# Patient Record
Sex: FEMALE | Race: BLACK OR AFRICAN AMERICAN
[De-identification: names, ages, dates, MRNs, and addresses within clinical notes are randomized per-mention and may not be internally consistent; named-entity substitution may affect disease eponyms.]

---

## 2017-01-23 ENCOUNTER — APPOINTMENT (OUTPATIENT)
Dept: CHRONIC DISEASE MANAGEMENT | Facility: CLINIC | Age: 37
End: 2017-01-23

## 2017-01-23 VITALS — HEIGHT: 65 IN | BODY MASS INDEX: 45.82 KG/M2 | WEIGHT: 275 LBS

## 2017-04-28 ENCOUNTER — APPOINTMENT (OUTPATIENT)
Dept: DERMATOLOGY | Facility: CLINIC | Age: 37
End: 2017-04-28

## 2017-07-14 ENCOUNTER — APPOINTMENT (OUTPATIENT)
Dept: DERMATOLOGY | Facility: CLINIC | Age: 37
End: 2017-07-14

## 2017-10-04 ENCOUNTER — APPOINTMENT (OUTPATIENT)
Dept: FAMILY MEDICINE | Facility: CLINIC | Age: 37
End: 2017-10-04
Payer: MEDICAID

## 2017-10-04 ENCOUNTER — LABORATORY RESULT (OUTPATIENT)
Age: 37
End: 2017-10-04

## 2017-10-04 ENCOUNTER — NON-APPOINTMENT (OUTPATIENT)
Age: 37
End: 2017-10-04

## 2017-10-04 VITALS
BODY MASS INDEX: 45.32 KG/M2 | DIASTOLIC BLOOD PRESSURE: 90 MMHG | HEART RATE: 84 BPM | HEIGHT: 65 IN | WEIGHT: 272 LBS | SYSTOLIC BLOOD PRESSURE: 130 MMHG

## 2017-10-04 DIAGNOSIS — Z82.3 FAMILY HISTORY OF STROKE: ICD-10-CM

## 2017-10-04 DIAGNOSIS — F15.90 OTHER STIMULANT USE, UNSPECIFIED, UNCOMPLICATED: ICD-10-CM

## 2017-10-04 DIAGNOSIS — Z83.3 FAMILY HISTORY OF DIABETES MELLITUS: ICD-10-CM

## 2017-10-04 DIAGNOSIS — Z82.49 FAMILY HISTORY OF ISCHEMIC HEART DISEASE AND OTHER DISEASES OF THE CIRCULATORY SYSTEM: ICD-10-CM

## 2017-10-04 PROCEDURE — 99204 OFFICE O/P NEW MOD 45 MIN: CPT | Mod: 25

## 2017-10-04 PROCEDURE — 93000 ELECTROCARDIOGRAM COMPLETE: CPT

## 2017-10-04 PROCEDURE — 36415 COLL VENOUS BLD VENIPUNCTURE: CPT

## 2017-10-04 RX ORDER — FLUTICASONE PROPIONATE 50 UG/1
50 SPRAY, METERED NASAL DAILY
Qty: 1 | Refills: 1 | Status: ACTIVE | COMMUNITY
Start: 2017-10-04 | End: 1900-01-01

## 2017-10-04 RX ORDER — MECLIZINE HCL 50 MG/1
TABLET ORAL
Refills: 0 | Status: ACTIVE | COMMUNITY

## 2017-10-09 LAB
25(OH)D3 SERPL-MCNC: 23.5 NG/ML
ALBUMIN SERPL ELPH-MCNC: 4.2 G/DL
ALP BLD-CCNC: 56 U/L
ALT SERPL-CCNC: 12 U/L
ANION GAP SERPL CALC-SCNC: 16 MMOL/L
APPEARANCE: CLEAR
AST SERPL-CCNC: 16 U/L
BASOPHILS # BLD AUTO: 0.06 K/UL
BASOPHILS NFR BLD AUTO: 1 %
BILIRUB SERPL-MCNC: 0.3 MG/DL
BILIRUBIN URINE: NEGATIVE
BLOOD URINE: NEGATIVE
BUN SERPL-MCNC: 8 MG/DL
CALCIUM SERPL-MCNC: 9.5 MG/DL
CHLORIDE SERPL-SCNC: 102 MMOL/L
CHOLEST SERPL-MCNC: 193 MG/DL
CHOLEST/HDLC SERPL: 4.8 RATIO
CO2 SERPL-SCNC: 23 MMOL/L
COLOR: ABNORMAL
CREAT SERPL-MCNC: 0.82 MG/DL
EOSINOPHIL # BLD AUTO: 0.22 K/UL
EOSINOPHIL NFR BLD AUTO: 3.7 %
FERRITIN SERPL-MCNC: 14 NG/ML
FOLATE SERPL-MCNC: 19.9 NG/ML
GLUCOSE QUALITATIVE U: NEGATIVE MG/DL
GLUCOSE SERPL-MCNC: 107 MG/DL
HBA1C MFR BLD HPLC: 6.2 %
HCT VFR BLD CALC: 41.2 %
HDLC SERPL-MCNC: 40 MG/DL
HGB BLD-MCNC: 12.4 G/DL
IMM GRANULOCYTES NFR BLD AUTO: 0 %
IRON SATN MFR SERPL: 10 %
IRON SERPL-MCNC: 35 UG/DL
KETONES URINE: NEGATIVE
LDLC SERPL CALC-MCNC: 113 MG/DL
LEUKOCYTE ESTERASE URINE: NEGATIVE
LYMPHOCYTES # BLD AUTO: 2.22 K/UL
LYMPHOCYTES NFR BLD AUTO: 37.4 %
MAN DIFF?: NORMAL
MCHC RBC-ENTMCNC: 23.6 PG
MCHC RBC-ENTMCNC: 30.1 GM/DL
MCV RBC AUTO: 78.5 FL
MONOCYTES # BLD AUTO: 0.36 K/UL
MONOCYTES NFR BLD AUTO: 6.1 %
NEUTROPHILS # BLD AUTO: 3.07 K/UL
NEUTROPHILS NFR BLD AUTO: 51.8 %
NITRITE URINE: NEGATIVE
PH URINE: 5
PLATELET # BLD AUTO: 450 K/UL
POTASSIUM SERPL-SCNC: 4 MMOL/L
PROT SERPL-MCNC: 7.7 G/DL
PROTEIN URINE: NEGATIVE MG/DL
RBC # BLD: 5.25 M/UL
RBC # FLD: 16.6 %
SODIUM SERPL-SCNC: 141 MMOL/L
SPECIFIC GRAVITY URINE: 1.02
T4 FREE SERPL-MCNC: 1.5 NG/DL
TIBC SERPL-MCNC: 366 UG/DL
TRIGL SERPL-MCNC: 201 MG/DL
TSH SERPL-ACNC: 1.08 UIU/ML
UIBC SERPL-MCNC: 331 UG/DL
UROBILINOGEN URINE: NEGATIVE MG/DL
VIT B12 SERPL-MCNC: 887 PG/ML
WBC # FLD AUTO: 5.93 K/UL

## 2017-10-17 ENCOUNTER — APPOINTMENT (OUTPATIENT)
Dept: FAMILY MEDICINE | Facility: CLINIC | Age: 37
End: 2017-10-17
Payer: MEDICAID

## 2017-10-17 VITALS
HEIGHT: 65 IN | BODY MASS INDEX: 45.82 KG/M2 | WEIGHT: 275 LBS | DIASTOLIC BLOOD PRESSURE: 82 MMHG | HEART RATE: 80 BPM | SYSTOLIC BLOOD PRESSURE: 122 MMHG

## 2017-10-17 DIAGNOSIS — R42 DIZZINESS AND GIDDINESS: ICD-10-CM

## 2017-10-17 PROCEDURE — 99213 OFFICE O/P EST LOW 20 MIN: CPT | Mod: 25

## 2017-10-17 PROCEDURE — 99395 PREV VISIT EST AGE 18-39: CPT | Mod: 25

## 2017-11-14 ENCOUNTER — APPOINTMENT (OUTPATIENT)
Dept: FAMILY MEDICINE | Facility: CLINIC | Age: 37
End: 2017-11-14
Payer: MEDICAID

## 2017-11-14 VITALS
HEART RATE: 77 BPM | BODY MASS INDEX: 46.98 KG/M2 | TEMPERATURE: 98.5 F | OXYGEN SATURATION: 98 % | DIASTOLIC BLOOD PRESSURE: 72 MMHG | HEIGHT: 65 IN | SYSTOLIC BLOOD PRESSURE: 112 MMHG | WEIGHT: 282 LBS

## 2017-11-14 DIAGNOSIS — K21.9 GASTRO-ESOPHAGEAL REFLUX DISEASE W/OUT ESOPHAGITIS: ICD-10-CM

## 2017-11-14 PROCEDURE — 99214 OFFICE O/P EST MOD 30 MIN: CPT

## 2017-11-14 RX ORDER — MULTIVITAMIN WITH FOLIC ACID 400 MCG
TABLET ORAL
Qty: 30 | Refills: 0 | Status: ACTIVE | COMMUNITY
Start: 2016-09-29

## 2017-11-14 RX ORDER — KETOCONAZOLE 20 MG/G
2 CREAM TOPICAL
Qty: 60 | Refills: 0 | Status: ACTIVE | COMMUNITY
Start: 2017-07-14

## 2017-11-14 RX ORDER — FAMOTIDINE 40 MG/1
40 TABLET, FILM COATED ORAL DAILY
Qty: 30 | Refills: 2 | Status: ACTIVE | COMMUNITY
Start: 2017-11-14 | End: 1900-01-01

## 2018-01-16 ENCOUNTER — APPOINTMENT (OUTPATIENT)
Dept: FAMILY MEDICINE | Facility: CLINIC | Age: 38
End: 2018-01-16

## 2018-11-30 ENCOUNTER — APPOINTMENT (OUTPATIENT)
Dept: FAMILY MEDICINE | Facility: CLINIC | Age: 38
End: 2018-11-30
Payer: MEDICAID

## 2018-11-30 VITALS
BODY MASS INDEX: 44.82 KG/M2 | HEIGHT: 65 IN | HEART RATE: 80 BPM | SYSTOLIC BLOOD PRESSURE: 118 MMHG | OXYGEN SATURATION: 98 % | DIASTOLIC BLOOD PRESSURE: 78 MMHG | TEMPERATURE: 99 F | WEIGHT: 269 LBS

## 2018-11-30 PROCEDURE — 99214 OFFICE O/P EST MOD 30 MIN: CPT | Mod: 25

## 2018-11-30 RX ORDER — ALBUTEROL SULFATE 90 UG/1
108 (90 BASE) AEROSOL, METERED RESPIRATORY (INHALATION)
Qty: 1 | Refills: 0 | Status: ACTIVE | COMMUNITY
Start: 2018-11-30 | End: 1900-01-01

## 2018-11-30 RX ORDER — FEXOFENADINE HYDROCHLORIDE 180 MG/1
180 TABLET ORAL DAILY
Qty: 30 | Refills: 0 | Status: ACTIVE | COMMUNITY
Start: 2017-10-04 | End: 1900-01-01

## 2018-11-30 RX ORDER — ALBUTEROL SULFATE 2.5 MG/3ML
(2.5 MG/3ML) SOLUTION RESPIRATORY (INHALATION)
Qty: 0 | Refills: 0 | Status: COMPLETED | OUTPATIENT
Start: 2018-11-30

## 2018-11-30 RX ADMIN — ALBUTEROL SULFATE 1 0.083%: 2.5 SOLUTION RESPIRATORY (INHALATION) at 00:00

## 2018-12-04 NOTE — PHYSICAL EXAM
[No Acute Distress] : no acute distress [Well Nourished] : well nourished [Well Developed] : well developed [Well-Appearing] : well-appearing [Normal Sclera/Conjunctiva] : normal sclera/conjunctiva [PERRL] : pupils equal round and reactive to light [EOMI] : extraocular movements intact [Normal Oropharynx] : the oropharynx was normal [No JVD] : no jugular venous distention [Supple] : supple [No Lymphadenopathy] : no lymphadenopathy [Thyroid Normal, No Nodules] : the thyroid was normal and there were no nodules present [No Respiratory Distress] : no respiratory distress  [No Accessory Muscle Use] : no accessory muscle use [Normal Rate] : normal rate  [Regular Rhythm] : with a regular rhythm [Normal S1, S2] : normal S1 and S2 [No Murmur] : no murmur heard [No Carotid Bruits] : no carotid bruits [No Abdominal Bruit] : a ~M bruit was not heard ~T in the abdomen [No Varicosities] : no varicosities [Pedal Pulses Present] : the pedal pulses are present [No Edema] : there was no peripheral edema [No Extremity Clubbing/Cyanosis] : no extremity clubbing/cyanosis [No Palpable Aorta] : no palpable aorta [Soft] : abdomen soft [Non Tender] : non-tender [Non-distended] : non-distended [No Masses] : no abdominal mass palpated [No HSM] : no HSM [Normal Bowel Sounds] : normal bowel sounds [Normal Posterior Cervical Nodes] : no posterior cervical lymphadenopathy [Normal Anterior Cervical Nodes] : no anterior cervical lymphadenopathy [No CVA Tenderness] : no CVA  tenderness [No Spinal Tenderness] : no spinal tenderness [No Joint Swelling] : no joint swelling [Grossly Normal Strength/Tone] : grossly normal strength/tone [No Rash] : no rash [Normal Gait] : normal gait [Coordination Grossly Intact] : coordination grossly intact [No Focal Deficits] : no focal deficits [Deep Tendon Reflexes (DTR)] : deep tendon reflexes were 2+ and symmetric [Normal Affect] : the affect was normal [Normal Insight/Judgement] : insight and judgment were intact [de-identified] : serous otitis [de-identified] : decrease air entry

## 2018-12-04 NOTE — HISTORY OF PRESENT ILLNESS
[___ Days ago] : [unfilled] days ago [Congestion] : congestion [Cough] : cough [Wheezing] : wheezing [Shortness Of Breath] : shortness of breath [Earache] : earache [Activity] : with activity [At Night] : at night [Worsening] : worsening [Sore Throat] : no sore throat [Chills] : no chills [Anorexia] : no anorexia [Fatigue] : not fatigue [Headache] : no headache [Fever] : no fever

## 2018-12-07 ENCOUNTER — APPOINTMENT (OUTPATIENT)
Dept: FAMILY MEDICINE | Facility: CLINIC | Age: 38
End: 2018-12-07
Payer: MEDICAID

## 2018-12-07 VITALS
SYSTOLIC BLOOD PRESSURE: 120 MMHG | HEART RATE: 78 BPM | DIASTOLIC BLOOD PRESSURE: 78 MMHG | HEIGHT: 65 IN | WEIGHT: 269 LBS | BODY MASS INDEX: 44.82 KG/M2

## 2018-12-07 DIAGNOSIS — R06.2 WHEEZING: ICD-10-CM

## 2018-12-07 PROCEDURE — 99214 OFFICE O/P EST MOD 30 MIN: CPT

## 2018-12-07 RX ORDER — FLUTICASONE FUROATE AND VILANTEROL TRIFENATATE 100; 25 UG/1; UG/1
100-25 POWDER RESPIRATORY (INHALATION)
Qty: 1 | Refills: 2 | Status: ACTIVE | COMMUNITY
Start: 2018-12-07 | End: 1900-01-01

## 2018-12-07 NOTE — HISTORY OF PRESENT ILLNESS
[FreeTextEntry1] : cough follow up [de-identified] : feels better but still has some lower lung tightness, used Ventolin very infrequently [___ Days ago] : [unfilled] days ago [Congestion] : congestion [Cough] : cough [Sore Throat] : no sore throat [Wheezing] : wheezing [Chills] : no chills [Anorexia] : no anorexia [Shortness Of Breath] : shortness of breath [Earache] : earache [Fatigue] : not fatigue [Headache] : no headache [Fever] : no fever [Activity] : with activity [At Night] : at night [Worsening] : worsening

## 2018-12-07 NOTE — PLAN
[FreeTextEntry1] : # add Breo/ gargle mouth\par # continue ventolin as needed\par follow up PRN or 1 month for CPE\par \par \par # zpack\par # allegra\par # follow up 2 week

## 2018-12-07 NOTE — PHYSICAL EXAM
[No Acute Distress] : no acute distress [Well Nourished] : well nourished [Well Developed] : well developed [Well-Appearing] : well-appearing [Normal Sclera/Conjunctiva] : normal sclera/conjunctiva [PERRL] : pupils equal round and reactive to light [EOMI] : extraocular movements intact [Normal Oropharynx] : the oropharynx was normal [No JVD] : no jugular venous distention [Supple] : supple [No Lymphadenopathy] : no lymphadenopathy [Thyroid Normal, No Nodules] : the thyroid was normal and there were no nodules present [No Respiratory Distress] : no respiratory distress  [No Accessory Muscle Use] : no accessory muscle use [Normal Rate] : normal rate  [Regular Rhythm] : with a regular rhythm [Normal S1, S2] : normal S1 and S2 [No Murmur] : no murmur heard [No Carotid Bruits] : no carotid bruits [No Abdominal Bruit] : a ~M bruit was not heard ~T in the abdomen [No Varicosities] : no varicosities [Pedal Pulses Present] : the pedal pulses are present [No Edema] : there was no peripheral edema [No Extremity Clubbing/Cyanosis] : no extremity clubbing/cyanosis [No Palpable Aorta] : no palpable aorta [Soft] : abdomen soft [Non Tender] : non-tender [Non-distended] : non-distended [No Masses] : no abdominal mass palpated [No HSM] : no HSM [Normal Bowel Sounds] : normal bowel sounds [Normal Posterior Cervical Nodes] : no posterior cervical lymphadenopathy [Normal Anterior Cervical Nodes] : no anterior cervical lymphadenopathy [No CVA Tenderness] : no CVA  tenderness [No Spinal Tenderness] : no spinal tenderness [No Joint Swelling] : no joint swelling [Grossly Normal Strength/Tone] : grossly normal strength/tone [No Rash] : no rash [Normal Gait] : normal gait [Coordination Grossly Intact] : coordination grossly intact [No Focal Deficits] : no focal deficits [Deep Tendon Reflexes (DTR)] : deep tendon reflexes were 2+ and symmetric [Normal Affect] : the affect was normal [Normal Insight/Judgement] : insight and judgment were intact [de-identified] : serous otitis [de-identified] : decrease air entry lower lung bases

## 2019-01-02 ENCOUNTER — RX RENEWAL (OUTPATIENT)
Age: 39
End: 2019-01-02

## 2019-01-02 RX ORDER — ALBUTEROL SULFATE 90 UG/1
108 (90 BASE) AEROSOL, METERED RESPIRATORY (INHALATION)
Qty: 1 | Refills: 0 | Status: ACTIVE | COMMUNITY
Start: 2019-01-02 | End: 1900-01-01

## 2019-01-10 ENCOUNTER — LABORATORY RESULT (OUTPATIENT)
Age: 39
End: 2019-01-10

## 2019-01-11 ENCOUNTER — APPOINTMENT (OUTPATIENT)
Dept: FAMILY MEDICINE | Facility: CLINIC | Age: 39
End: 2019-01-11
Payer: MEDICAID

## 2019-01-11 VITALS
HEIGHT: 65 IN | BODY MASS INDEX: 44.98 KG/M2 | HEART RATE: 80 BPM | WEIGHT: 270 LBS | SYSTOLIC BLOOD PRESSURE: 110 MMHG | DIASTOLIC BLOOD PRESSURE: 76 MMHG

## 2019-01-11 DIAGNOSIS — R05 COUGH: ICD-10-CM

## 2019-01-11 PROCEDURE — 36415 COLL VENOUS BLD VENIPUNCTURE: CPT

## 2019-01-11 PROCEDURE — 99395 PREV VISIT EST AGE 18-39: CPT | Mod: 25

## 2019-01-11 PROCEDURE — 99214 OFFICE O/P EST MOD 30 MIN: CPT | Mod: 25

## 2019-01-15 LAB
25(OH)D3 SERPL-MCNC: 23.2 NG/ML
ALBUMIN SERPL ELPH-MCNC: 4 G/DL
ALP BLD-CCNC: 59 U/L
ALT SERPL-CCNC: 7 U/L
ANION GAP SERPL CALC-SCNC: 12 MMOL/L
APPEARANCE: ABNORMAL
AST SERPL-CCNC: 12 U/L
BASOPHILS # BLD AUTO: 0.02 K/UL
BASOPHILS NFR BLD AUTO: 0.3 %
BILIRUB SERPL-MCNC: 0.3 MG/DL
BILIRUBIN URINE: NEGATIVE
BLOOD URINE: NEGATIVE
BUN SERPL-MCNC: 7 MG/DL
C TRACH RRNA SPEC QL NAA+PROBE: NOT DETECTED
CALCIUM SERPL-MCNC: 9.5 MG/DL
CHLORIDE SERPL-SCNC: 107 MMOL/L
CHOLEST SERPL-MCNC: 191 MG/DL
CHOLEST/HDLC SERPL: 4.4 RATIO
CO2 SERPL-SCNC: 24 MMOL/L
COLOR: YELLOW
CREAT SERPL-MCNC: 0.8 MG/DL
EOSINOPHIL # BLD AUTO: 0.19 K/UL
EOSINOPHIL NFR BLD AUTO: 2.7 %
FERRITIN SERPL-MCNC: 11 NG/ML
FOLATE SERPL-MCNC: 6.4 NG/ML
GLUCOSE QUALITATIVE U: NEGATIVE MG/DL
GLUCOSE SERPL-MCNC: 105 MG/DL
HBA1C MFR BLD HPLC: 6.1 %
HBV CORE IGG+IGM SER QL: NONREACTIVE
HBV SURFACE AB SER QL: NONREACTIVE
HBV SURFACE AG SER QL: NONREACTIVE
HCT VFR BLD CALC: 39.7 %
HCV AB SER QL: NONREACTIVE
HCV S/CO RATIO: 0.09 S/CO
HDLC SERPL-MCNC: 43 MG/DL
HGB BLD-MCNC: 12 G/DL
HIV1+2 AB SPEC QL IA.RAPID: NONREACTIVE
HSV 1+2 IGG SER IA-IMP: NEGATIVE
HSV 1+2 IGG SER IA-IMP: POSITIVE
HSV1 IGG SER QL: 46.1 INDEX
HSV2 IGG SER QL: 0.11 INDEX
IMM GRANULOCYTES NFR BLD AUTO: 0.1 %
IRON SATN MFR SERPL: 8 %
IRON SERPL-MCNC: 31 UG/DL
KETONES URINE: NEGATIVE
LDLC SERPL CALC-MCNC: 108 MG/DL
LEUKOCYTE ESTERASE URINE: NEGATIVE
LYMPHOCYTES # BLD AUTO: 2.73 K/UL
LYMPHOCYTES NFR BLD AUTO: 38.7 %
MAN DIFF?: NORMAL
MCHC RBC-ENTMCNC: 23 PG
MCHC RBC-ENTMCNC: 30.2 GM/DL
MCV RBC AUTO: 76.2 FL
MONOCYTES # BLD AUTO: 0.48 K/UL
MONOCYTES NFR BLD AUTO: 6.8 %
N GONORRHOEA RRNA SPEC QL NAA+PROBE: NOT DETECTED
NEUTROPHILS # BLD AUTO: 3.63 K/UL
NEUTROPHILS NFR BLD AUTO: 51.4 %
NITRITE URINE: NEGATIVE
PH URINE: 5
PLATELET # BLD AUTO: 505 K/UL
POTASSIUM SERPL-SCNC: 4 MMOL/L
PROT SERPL-MCNC: 6.9 G/DL
PROTEIN URINE: NEGATIVE MG/DL
RBC # BLD: 5.21 M/UL
RBC # FLD: 15.9 %
RHEUMATOID FACT SER QL: <10 IU/ML
SODIUM SERPL-SCNC: 143 MMOL/L
SOURCE AMPLIFICATION: NORMAL
SPECIFIC GRAVITY URINE: 1.02
T PALLIDUM AB SER QL IA: NEGATIVE
T4 FREE SERPL-MCNC: 1.1 NG/DL
TIBC SERPL-MCNC: 377 UG/DL
TRIGL SERPL-MCNC: 201 MG/DL
TSH SERPL-ACNC: 2.28 UIU/ML
UIBC SERPL-MCNC: 346 UG/DL
URATE SERPL-MCNC: 4.7 MG/DL
UROBILINOGEN URINE: NEGATIVE MG/DL
VIT B12 SERPL-MCNC: 517 PG/ML
WBC # FLD AUTO: 7.06 K/UL

## 2019-01-15 NOTE — PHYSICAL EXAM
[No Acute Distress] : no acute distress [Well Nourished] : well nourished [Well Developed] : well developed [Well-Appearing] : well-appearing [Normal Sclera/Conjunctiva] : normal sclera/conjunctiva [PERRL] : pupils equal round and reactive to light [EOMI] : extraocular movements intact [Normal Oropharynx] : the oropharynx was normal [No JVD] : no jugular venous distention [Supple] : supple [No Lymphadenopathy] : no lymphadenopathy [Thyroid Normal, No Nodules] : the thyroid was normal and there were no nodules present [No Respiratory Distress] : no respiratory distress  [No Accessory Muscle Use] : no accessory muscle use [Normal Rate] : normal rate  [Regular Rhythm] : with a regular rhythm [Normal S1, S2] : normal S1 and S2 [No Murmur] : no murmur heard [No Carotid Bruits] : no carotid bruits [No Abdominal Bruit] : a ~M bruit was not heard ~T in the abdomen [No Varicosities] : no varicosities [Pedal Pulses Present] : the pedal pulses are present [No Edema] : there was no peripheral edema [No Extremity Clubbing/Cyanosis] : no extremity clubbing/cyanosis [No Palpable Aorta] : no palpable aorta [Soft] : abdomen soft [Non Tender] : non-tender [Non-distended] : non-distended [No Masses] : no abdominal mass palpated [No HSM] : no HSM [Normal Bowel Sounds] : normal bowel sounds [Normal Posterior Cervical Nodes] : no posterior cervical lymphadenopathy [Normal Anterior Cervical Nodes] : no anterior cervical lymphadenopathy [No CVA Tenderness] : no CVA  tenderness [No Spinal Tenderness] : no spinal tenderness [No Joint Swelling] : no joint swelling [Grossly Normal Strength/Tone] : grossly normal strength/tone [No Rash] : no rash [Normal Gait] : normal gait [Coordination Grossly Intact] : coordination grossly intact [No Focal Deficits] : no focal deficits [Deep Tendon Reflexes (DTR)] : deep tendon reflexes were 2+ and symmetric [Normal Affect] : the affect was normal [Normal Insight/Judgement] : insight and judgment were intact [de-identified] : serous otitis [de-identified] : decrease air entry lower lung bases

## 2019-01-15 NOTE — PLAN
[FreeTextEntry1] : # lab\par # hand tendinitis- ice/motrina nd hand splints/ if no improvement will send patient to ortho\par # cough improving.\par # add Breo/ gargle mouth\par # continue ventolin as needed\par follow up PRN or after lab to discuss weight management\par \par \par # zpack\par # allegra\par # follow up 2 week

## 2019-01-15 NOTE — HISTORY OF PRESENT ILLNESS
[de-identified] : feels better but still has some lower lung tightness, used Ventolin very infrequently now.\elizabeth has hand pain shameka on the right thum- patient is a  and her hands are in constant motion\elizabeth wants to work on weight [FreeTextEntry1] : cough follow up [___ Days ago] : [unfilled] days ago [Congestion] : congestion [Cough] : cough [Sore Throat] : no sore throat [Wheezing] : wheezing [Chills] : no chills [Anorexia] : no anorexia [Shortness Of Breath] : shortness of breath [Earache] : earache [Fatigue] : not fatigue [Headache] : no headache [Fever] : no fever [Activity] : with activity [At Night] : at night [Worsening] : worsening

## 2019-01-15 NOTE — HEALTH RISK ASSESSMENT
[Good] : ~his/her~  mood as  good [] : No [No falls in past year] : Patient reported no falls in the past year [0] : 2) Feeling down, depressed, or hopeless: Not at all (0) [Change in mental status noted] : No change in mental status noted [Language] : denies difficulty with language [Handling Complex Tasks] : denies difficulty handling complex tasks [None] : None [Employed] : employed [Sexually Active] : sexually active [Feels Safe at Home] : Feels safe at home [Fully functional (bathing, dressing, toileting, transferring, walking, feeding)] : Fully functional (bathing, dressing, toileting, transferring, walking, feeding) [Fully functional (using the telephone, shopping, preparing meals, housekeeping, doing laundry, using] : Fully functional and needs no help or supervision to perform IADLs (using the telephone, shopping, preparing meals, housekeeping, doing laundry, using transportation, managing medications and managing finances) [Discussed at today's visit] : Advance Directives Discussed at today's visit

## 2019-01-18 LAB — ANA SER IF-ACNC: NEGATIVE

## 2019-01-25 ENCOUNTER — APPOINTMENT (OUTPATIENT)
Dept: FAMILY MEDICINE | Facility: CLINIC | Age: 39
End: 2019-01-25
Payer: MEDICAID

## 2019-01-25 VITALS
BODY MASS INDEX: 44.98 KG/M2 | SYSTOLIC BLOOD PRESSURE: 118 MMHG | WEIGHT: 270 LBS | HEART RATE: 78 BPM | HEIGHT: 65 IN | DIASTOLIC BLOOD PRESSURE: 72 MMHG

## 2019-01-25 PROCEDURE — 99214 OFFICE O/P EST MOD 30 MIN: CPT

## 2019-01-27 NOTE — HISTORY OF PRESENT ILLNESS
[___ Days ago] : [unfilled] days ago [Congestion] : congestion [Cough] : cough [Wheezing] : wheezing [Shortness Of Breath] : shortness of breath [Earache] : earache [Activity] : with activity [At Night] : at night [Worsening] : worsening [de-identified] : feels better but still has some lower lung tightness, used Ventolin very infrequently now.\par has hand pain shameka on the right thum- patient is a  and her hands are in constant motion\par wants to work on weight\par 01/2019- feel better with cough but thumb right hand tendinitis not doing so well got wrist splint but thumb is not stablised. \par \par also wants to work on weight loss,trying hard but its not working [FreeTextEntry1] : Physical [Sore Throat] : no sore throat [Chills] : no chills [Anorexia] : no anorexia [Fatigue] : not fatigue [Headache] : no headache [Fever] : no fever

## 2019-01-27 NOTE — HEALTH RISK ASSESSMENT
[No falls in past year] : Patient reported no falls in the past year [0] : 2) Feeling down, depressed, or hopeless: Not at all (0) [Good] : ~his/her~  mood as  good [None] : None [Employed] : employed [Sexually Active] : sexually active [Feels Safe at Home] : Feels safe at home [Fully functional (bathing, dressing, toileting, transferring, walking, feeding)] : Fully functional (bathing, dressing, toileting, transferring, walking, feeding) [Fully functional (using the telephone, shopping, preparing meals, housekeeping, doing laundry, using] : Fully functional and needs no help or supervision to perform IADLs (using the telephone, shopping, preparing meals, housekeeping, doing laundry, using transportation, managing medications and managing finances) [Discussed at today's visit] : Advance Directives Discussed at today's visit [] : No [Change in mental status noted] : No change in mental status noted [Language] : denies difficulty with language [Handling Complex Tasks] : denies difficulty handling complex tasks

## 2019-01-27 NOTE — PHYSICAL EXAM
[No Acute Distress] : no acute distress [Well Nourished] : well nourished [Well Developed] : well developed [Well-Appearing] : well-appearing [Normal Sclera/Conjunctiva] : normal sclera/conjunctiva [PERRL] : pupils equal round and reactive to light [EOMI] : extraocular movements intact [Normal Oropharynx] : the oropharynx was normal [No JVD] : no jugular venous distention [Supple] : supple [No Lymphadenopathy] : no lymphadenopathy [Thyroid Normal, No Nodules] : the thyroid was normal and there were no nodules present [No Respiratory Distress] : no respiratory distress  [No Accessory Muscle Use] : no accessory muscle use [Normal Rate] : normal rate  [Regular Rhythm] : with a regular rhythm [Normal S1, S2] : normal S1 and S2 [No Murmur] : no murmur heard [No Carotid Bruits] : no carotid bruits [No Abdominal Bruit] : a ~M bruit was not heard ~T in the abdomen [No Varicosities] : no varicosities [Pedal Pulses Present] : the pedal pulses are present [No Edema] : there was no peripheral edema [No Extremity Clubbing/Cyanosis] : no extremity clubbing/cyanosis [No Palpable Aorta] : no palpable aorta [Soft] : abdomen soft [Non Tender] : non-tender [Non-distended] : non-distended [No Masses] : no abdominal mass palpated [No HSM] : no HSM [Normal Bowel Sounds] : normal bowel sounds [Normal Posterior Cervical Nodes] : no posterior cervical lymphadenopathy [Normal Anterior Cervical Nodes] : no anterior cervical lymphadenopathy [No CVA Tenderness] : no CVA  tenderness [No Spinal Tenderness] : no spinal tenderness [No Joint Swelling] : no joint swelling [Grossly Normal Strength/Tone] : grossly normal strength/tone [No Rash] : no rash [Normal Gait] : normal gait [Coordination Grossly Intact] : coordination grossly intact [No Focal Deficits] : no focal deficits [Deep Tendon Reflexes (DTR)] : deep tendon reflexes were 2+ and symmetric [Normal Affect] : the affect was normal [Normal Insight/Judgement] : insight and judgment were intact [de-identified] : serous otitis [de-identified] : decrease air entry lower lung bases

## 2019-01-27 NOTE — PLAN
[FreeTextEntry1] : # obsity- start phentermine/topamax for weight loss as directed\par follow a diet and exercise works for her.\par \par Diet and Activity: - Choose lean meats and poultry without skin and prepare them without added saturated and trans \par fat. Eat fish at least,twice a week. Recent research shows that eating oily \par fish containing omega-3,fatty acids (for example, salmon, trout and herring) \par may help lower your risk of death from coronary artery disease. Select \par fat-free, 1 percent fat and low-fat dairy products. Cut back on foods \par containing partially hydrogenated vegetable oils to reduce trans fat in your \par diet. To lower cholesterol, reduce saturated fat to no more than 5 to 6 \par percent of total calories. For someone eating 2,000 calories a day, that’s \par about 13 grams of saturated fat. Cut back on beverages and foods with added \par sugars. Choose and prepare foods with little or no salt. To lower blood \par pressure, aim to eat no more than 2,400 milligrams of sodium per day. \par Reducing daily intake to 1,500 mg is desirable because it can lower blood \par pressure even further. If you drink alcohol, drink in moderation. That means \par one drink per day if youre a woman and two drinks per day if youre a \par man Follow the American Heart Association recommendations when you eat out, \par and keep an eye on your portion sizes.\par \par discussed in detail and patient agrees to try harder with 3x/ week exercise\par \par # lab- normal \par # hand tendinitis- ice/motrina nd hand splints/ if no improvement will send patient to ortho- advise thumb stablization and \par # cough improving.\par # add Breo/ gargle mouth\par # continue ventolin as needed\par follow up PRN or after lab to discuss weight management\par \par \par # zpack\par # allegra\par # follow up 2 week

## 2019-02-26 ENCOUNTER — RX RENEWAL (OUTPATIENT)
Age: 39
End: 2019-02-26

## 2019-03-11 ENCOUNTER — APPOINTMENT (OUTPATIENT)
Dept: FAMILY MEDICINE | Facility: CLINIC | Age: 39
End: 2019-03-11

## 2019-06-07 ENCOUNTER — APPOINTMENT (OUTPATIENT)
Dept: FAMILY MEDICINE | Facility: CLINIC | Age: 39
End: 2019-06-07
Payer: MEDICAID

## 2019-06-07 VITALS
HEART RATE: 80 BPM | BODY MASS INDEX: 42.49 KG/M2 | SYSTOLIC BLOOD PRESSURE: 120 MMHG | HEIGHT: 65 IN | DIASTOLIC BLOOD PRESSURE: 78 MMHG | WEIGHT: 255 LBS

## 2019-06-07 DIAGNOSIS — M25.519 CERVICALGIA: ICD-10-CM

## 2019-06-07 DIAGNOSIS — M54.2 CERVICALGIA: ICD-10-CM

## 2019-06-07 PROCEDURE — 99214 OFFICE O/P EST MOD 30 MIN: CPT | Mod: 25

## 2019-06-07 PROCEDURE — 96372 THER/PROPH/DIAG INJ SC/IM: CPT

## 2019-06-07 RX ORDER — CYANOCOBALAMIN 1000 UG/ML
1000 INJECTION INTRAMUSCULAR; SUBCUTANEOUS
Qty: 0 | Refills: 0 | Status: COMPLETED | OUTPATIENT
Start: 2019-06-07

## 2019-06-07 RX ADMIN — CYANOCOBALAMIN 0 MCG/ML: 1000 INJECTION, SOLUTION INTRAMUSCULAR; SUBCUTANEOUS at 00:00

## 2019-06-07 NOTE — HISTORY OF PRESENT ILLNESS
[FreeTextEntry1] : fatigue and hand numbness [de-identified] : c/o tingling in arms and legs/ also has slight hand numbness on the right side\par \par lost weight on low carb has not started phentermine yet/ feels hungary all the time\par \par

## 2019-06-07 NOTE — PLAN
[FreeTextEntry1] : obesity- recommend weight loss meds\par CTS- hand surgery referral/ continue weight loss/splint and PT\par b12 def- start cyanocobalamin 1000 microgram IM\par \par follow up 1 month

## 2019-07-02 ENCOUNTER — APPOINTMENT (OUTPATIENT)
Dept: ORTHOPEDIC SURGERY | Facility: CLINIC | Age: 39
End: 2019-07-02
Payer: MEDICAID

## 2019-07-02 DIAGNOSIS — R29.898 OTHER SYMPTOMS AND SIGNS INVOLVING THE MUSCULOSKELETAL SYSTEM: ICD-10-CM

## 2019-07-02 PROCEDURE — 99203 OFFICE O/P NEW LOW 30 MIN: CPT

## 2019-07-10 ENCOUNTER — APPOINTMENT (OUTPATIENT)
Dept: FAMILY MEDICINE | Facility: CLINIC | Age: 39
End: 2019-07-10
Payer: MEDICAID

## 2019-07-10 DIAGNOSIS — M77.9 ENTHESOPATHY, UNSPECIFIED: ICD-10-CM

## 2019-07-10 PROCEDURE — 36415 COLL VENOUS BLD VENIPUNCTURE: CPT

## 2019-07-10 PROCEDURE — 99214 OFFICE O/P EST MOD 30 MIN: CPT | Mod: 25

## 2019-07-10 PROCEDURE — 96372 THER/PROPH/DIAG INJ SC/IM: CPT

## 2019-07-10 RX ORDER — CYANOCOBALAMIN 1000 UG/ML
1000 INJECTION INTRAMUSCULAR; SUBCUTANEOUS
Qty: 0 | Refills: 0 | Status: COMPLETED | OUTPATIENT
Start: 2019-07-10

## 2019-07-10 RX ADMIN — CYANOCOBALAMIN 0 MCG/ML: 1000 INJECTION INTRAMUSCULAR; SUBCUTANEOUS at 00:00

## 2019-07-10 RX ADMIN — CYANOCOBALAMIN 0 MCG/ML: 1000 INJECTION, SOLUTION INTRAMUSCULAR; SUBCUTANEOUS at 00:00

## 2019-07-10 NOTE — PLAN
[FreeTextEntry1] : obesity- recommend weight loss meds/ continue/ doing well/ CMP today\par \par b12 def- start cyanocobalamin 1000 microgram IM\par Tendinitis - hand surgery referral/ continue weight loss/splint and PT- feeling better \par follow up 1 month

## 2019-07-10 NOTE — HISTORY OF PRESENT ILLNESS
[FreeTextEntry1] : B12 and hand numbness [de-identified] : c/o tingling in arms and legs/ also has slight hand numbness on the right side\par \par lost weight on low carb has not started phentermine yet/ feels hungary all the time\par \par 07/2019- hand numbness is better doing PT\par has lost weight and not taking phentermine daily\par

## 2019-07-12 LAB
ALBUMIN SERPL ELPH-MCNC: 4.2 G/DL
ALP BLD-CCNC: 53 U/L
ALT SERPL-CCNC: 11 U/L
ANION GAP SERPL CALC-SCNC: 12 MMOL/L
AST SERPL-CCNC: 13 U/L
BILIRUB SERPL-MCNC: 0.2 MG/DL
BUN SERPL-MCNC: 12 MG/DL
CALCIUM SERPL-MCNC: 9.2 MG/DL
CHLORIDE SERPL-SCNC: 110 MMOL/L
CO2 SERPL-SCNC: 20 MMOL/L
CREAT SERPL-MCNC: 0.9 MG/DL
GLUCOSE SERPL-MCNC: 126 MG/DL
POTASSIUM SERPL-SCNC: 4.1 MMOL/L
PROT SERPL-MCNC: 7 G/DL
SODIUM SERPL-SCNC: 142 MMOL/L
VIT B12 SERPL-MCNC: >2000 PG/ML

## 2019-07-24 ENCOUNTER — APPOINTMENT (OUTPATIENT)
Dept: ORTHOPEDIC SURGERY | Facility: CLINIC | Age: 39
End: 2019-07-24
Payer: MEDICAID

## 2019-07-24 VITALS
BODY MASS INDEX: 40.82 KG/M2 | SYSTOLIC BLOOD PRESSURE: 146 MMHG | DIASTOLIC BLOOD PRESSURE: 82 MMHG | WEIGHT: 245 LBS | HEART RATE: 76 BPM | HEIGHT: 65 IN

## 2019-07-24 PROCEDURE — 73030 X-RAY EXAM OF SHOULDER: CPT | Mod: RT

## 2019-07-24 PROCEDURE — 99214 OFFICE O/P EST MOD 30 MIN: CPT

## 2019-07-24 RX ORDER — MELOXICAM 7.5 MG/1
7.5 TABLET ORAL
Qty: 21 | Refills: 0 | Status: COMPLETED | COMMUNITY
Start: 2019-07-24 | End: 2019-08-14

## 2019-07-24 NOTE — HISTORY OF PRESENT ILLNESS
[de-identified] : Sherron is a 38 year old female who presents today with right sided shoulder pain and right elbow pain. She works as a  and feels her pain has progressively worsened to her posterior right shoulder > left and right elbow. She is unable to modify her activities due to work so she frequently works through the pain. She has some numbness and tingling from her shoulder to her elbow. She also has neck pain on this side and has not seen a back/ spine specialist.

## 2019-07-24 NOTE — DISCUSSION/SUMMARY
[de-identified] : Sherron is a 38 year old female who presents today with right sided shoulder pain and right elbow pain. She works as a  and feels her pain has progressively worsened to her posterior right shoulder > left and right elbow. Pt denies any injury to this extremity. She admits to some numbness/tingling from shoulder to elbow. She has been in PT recently for this but feels it is only giving her minimal relief of symptoms.\par \par The patient presents today with acute onset of her right shoulder pain consistent with a clinical diagnosis of calcific tendinopathy. Radiographs show a calcification at the rotator cuff insertion. Clinically, the patient specific point tenderness to this location as well as positive impingement signs. I discussed the pathophysiology of the diagnosis and the distinction between the resorptive and formative phase.  I discussed the treatment of calcific tendinitis with the patient at length today and the inflammatory process it incites during resorptive phase.\par \par I discussed the treatment of calcific tendinitis with the patient including nonoperative management as first line treatment. Anti-inflammatory medications (NSAID's) with physical therapy for strengthening and conditioning of the joint to preserve shoulder range of motion is typically required. Corticosteroid injection may be indicated for refractory cases and for acute symptomatic pain relief. If nonoperative management fails, arthroscopic debridement with possible rotator cuff repair if damaged during surgery may be required for recalcitrant cases.\par \par The patient elected to proceed with nonoperative management including continuing PT alongside Mobic x 21 days with food, if negative symptoms occur she should d/c medication and call us at that time. We will see her for clinical re-assessment in 4-6 weeks. The pt agrees to the above plan and all questions were answered.\par

## 2019-07-24 NOTE — PHYSICAL EXAM
[de-identified] : Physical Exam:\par General: Well appearing, no acute distress\par Neurologic: A&Ox3, No focal deficits\par Head: NCAT without abrasions, lacerations, or ecchymosis to head, face, or scalp\par Eyes: No scleral icterus, no gross abnormalities\par Respiratory: Equal chest wall expansion bilaterally, no accessory muscle use\par Lymphatic: No lymphadenopathy palpated\par Skin: Warm and dry\par Psychiatric: Normal mood and affect\par \par Elbow Exam\par \par Skin: Clean, dry, intact. No ecchymosis. No swelling. No palpable joint effusion.\par ROM: RIGHT 0-140, full supination/pronation.  LEFT 0-140, full supination/pronation.\par Painful ROM: None\par Tenderness: No medial epicondyle pain. POS lateral epicondyle pain. No olecranon pain. No pain at radial head.\par Strength: 5/5 elbow flexion, 5/5 elbow extension, 5/5 supination, 5/5 pronation\par Stability: Stable to vaus/valgus stress\par Vasc: 2+ radial pulse, <2s cap refill\par Sensation: In tact to light touch throughout\par Neuro: Negative tinels at ulnar canal, AIN/PIN/Ulnar nerve in tact to motor/sensation.\par \par Right Shoulder\par ·	Inspection/Palpation: no tenderness, swelling or deformities\par ·	Range of Motion: full and painless in all planes, no crepitus\par ·	Strength: forward elevation in scapular plane 5/5, internal rotation 5/5, external rotation 5/5, adduction 5/5 and abduction 5/5\par ·	Stability: no joint instability on provocative testing\par ·	Tests: Moctezuma test negative, Neer sign negative, negative drop arm test secondary to pain, bear hug test negative, Napolean sign negative, cross arm adduction negative, lift off sign positive, hornblowers sign negative, speeds test negative, Yergason's test negative, no bicipital groove tenderness, Hummel's Active Compression test negative\par \par Left Shoulder\par ·	Inspection/Palpation: no tenderness, swelling or deformities\par ·	Range of Motion: full and painless in all planes, no crepitus\par ·	Strength: forward elevation in scapular plane 5/5, internal rotation 5/5, external rotation 5/5, adduction 5/5 and abduction 5/5\par ·	Stability: no joint instability on provocative testing\par ·	Tests: Moctezuma test negative, Neer sign negative, negative drop arm test secondary to pain, bear hug test negative, Napolean sign negative, cross arm adduction negative, lift off sign positive, hornblowers sign negative, speeds test negative, Yergason's test negative, no bicipital groove tenderness, Hummel's Active Compression test negative [de-identified] : The following radiographs were ordered and read by me during this patients visit. I reviewed each radiograph in detail with the patient and discussed the findings as highlighted below. \par \par 3 views of the right shoulder show calcium deposit at the RTC insertion. There is no fracture, no dislocation. There is no evidence of degenerative change in the glenohumeral and acromioclavicular joints with maintenance of the joint space. Otherwise unremarkable. Calcification noted at supraspinatus insertion.

## 2019-07-24 NOTE — CONSULT LETTER
[Consult Letter:] : I had the pleasure of evaluating your patient, [unfilled]. [Dear  ___] : Dear  [unfilled], [Please see my note below.] : Please see my note below. [Consult Closing:] : Thank you very much for allowing me to participate in the care of this patient.  If you have any questions, please do not hesitate to contact me. [( Thank you for referring [unfilled] for consultation for _____ )] : Thank you for referring [unfilled] for consultation for [unfilled] [Sincerely,] : Sincerely, [FreeTextEntry2] : LEYDI STEELE\par  [FreeTextEntry3] : Kailash Yan DO.\par Sports Medicine \par \par Orthopaedic Surgery\par \par [facility] @ [facility] \par \par [address] \par [address]\[place] [address]\par \par [address] [facility] \par [address] \[place] [address]\par \par Tel [phone]\par Fax [phone]\par \par For same day and next day orthopedic appointments contact:\par [email or web address] |[phone])\par Appointments available nights and weekends!  \par \par [facility]\par Visit us at [email or web address]

## 2019-08-07 ENCOUNTER — APPOINTMENT (OUTPATIENT)
Dept: FAMILY MEDICINE | Facility: CLINIC | Age: 39
End: 2019-08-07
Payer: MEDICAID

## 2019-08-07 VITALS
HEART RATE: 76 BPM | SYSTOLIC BLOOD PRESSURE: 138 MMHG | DIASTOLIC BLOOD PRESSURE: 78 MMHG | BODY MASS INDEX: 40.82 KG/M2 | HEIGHT: 65 IN | WEIGHT: 245 LBS

## 2019-08-07 DIAGNOSIS — R29.898 OTHER SYMPTOMS AND SIGNS INVOLVING THE MUSCULOSKELETAL SYSTEM: ICD-10-CM

## 2019-08-07 PROCEDURE — 99214 OFFICE O/P EST MOD 30 MIN: CPT | Mod: 25

## 2019-08-07 PROCEDURE — 96372 THER/PROPH/DIAG INJ SC/IM: CPT

## 2019-08-07 RX ORDER — CYANOCOBALAMIN 1000 UG/ML
1000 INJECTION INTRAMUSCULAR; SUBCUTANEOUS
Qty: 0 | Refills: 0 | Status: COMPLETED | OUTPATIENT
Start: 2019-08-07

## 2019-08-07 RX ADMIN — CYANOCOBALAMIN 0 MCG/ML: 1000 INJECTION, SOLUTION INTRAMUSCULAR; SUBCUTANEOUS at 00:00

## 2019-08-07 NOTE — HISTORY OF PRESENT ILLNESS
[FreeTextEntry1] : B12 and hand numbness/ Obesity [de-identified] : c/o tingling in arms and legs/ also has slight hand numbness on the right side\par \par lost weight on low carb has not started phentermine yet/ feels hungary all the time\par \par 07/2019- hand numbness is better doing PT\par has lost weight and not taking phentermine daily\par \par 08/2019- here for b12 Injection\par patient states she takes Phentermine On and off and has ran out .\par she is taking meloxicam for pain now

## 2019-08-07 NOTE — PLAN
[FreeTextEntry1] : obesity- restart Phentermine and topamax and advise to take it regularly\par recommend weight loss meds/ continue/ doing well/ CMP today\par prediabetes- will get lab/ and patient is trying to lose weight\par b12 def- start cyanocobalamin 1000 microgram IM- 1 Inj today\par Tendinitis - doing better\par  hand surgery referral/ continue weight loss/splint and PT- feeling better \par follow up 1 month

## 2019-09-09 ENCOUNTER — APPOINTMENT (OUTPATIENT)
Dept: ORTHOPEDIC SURGERY | Facility: CLINIC | Age: 39
End: 2019-09-09
Payer: MEDICAID

## 2019-09-09 DIAGNOSIS — M77.11 LATERAL EPICONDYLITIS, RIGHT ELBOW: ICD-10-CM

## 2019-09-09 DIAGNOSIS — M25.511 PAIN IN RIGHT SHOULDER: ICD-10-CM

## 2019-09-09 PROCEDURE — 99214 OFFICE O/P EST MOD 30 MIN: CPT

## 2019-09-09 RX ORDER — MELOXICAM 7.5 MG/1
7.5 TABLET ORAL
Qty: 21 | Refills: 0 | Status: COMPLETED | COMMUNITY
Start: 2019-09-09 | End: 2019-09-30

## 2019-09-09 NOTE — HISTORY OF PRESENT ILLNESS
[de-identified] : Sherron is a 38 year old female who presents today for follow-up of her right sided shoulder pain and right elbow pain. She works as a  and feels her pain has progressively worsened to her posterior right shoulder > left and right elbow. She is unable to modify her activities due to work so she frequently works through the pain. \par \par Currently, she has performed PT 2x/week since seeing us last in July alongside Mobic x 21 days that she feels helped her greatly but at times she is still experiencing pain in her elbow due to overuse.

## 2019-09-09 NOTE — PHYSICAL EXAM
[de-identified] : Physical Exam:\par General: Well appearing, no acute distress\par Neurologic: A&Ox3, No focal deficits\par Head: NCAT without abrasions, lacerations, or ecchymosis to head, face, or scalp\par Eyes: No scleral icterus, no gross abnormalities\par Respiratory: Equal chest wall expansion bilaterally, no accessory muscle use\par Lymphatic: No lymphadenopathy palpated\par Skin: Warm and dry\par Psychiatric: Normal mood and affect\par \par Elbow Exam\par \par Skin: Clean, dry, intact. No ecchymosis. No swelling. No palpable joint effusion.\par ROM: RIGHT 0-140, full supination/pronation.  LEFT 0-140, full supination/pronation.\par Painful ROM: None\par Tenderness: No medial epicondyle pain. POS lateral epicondyle pain. No olecranon pain. No pain at radial head.\par Strength: 5/5 elbow flexion, 5/5 elbow extension, 5/5 supination, 5/5 pronation\par Stability: Stable to vaus/valgus stress\par Vasc: 2+ radial pulse, <2s cap refill\par Sensation: In tact to light touch throughout\par Neuro: Negative tinels at ulnar canal, AIN/PIN/Ulnar nerve in tact to motor/sensation.\par \par Right Shoulder\par ·	Inspection/Palpation: no tenderness, swelling or deformities\par ·	Range of Motion: full and painless in all planes, no crepitus\par ·	Strength: forward elevation in scapular plane 5/5, internal rotation 5/5, external rotation 5/5, adduction 5/5 and abduction 5/5\par ·	Stability: no joint instability on provocative testing\par ·	Tests: Moctezuma test negative, Neer sign negative, negative drop arm test secondary to pain, bear hug test negative, Napolean sign negative, cross arm adduction negative, lift off sign positive, hornblowers sign negative, speeds test negative, Yergason's test negative, no bicipital groove tenderness, Hummel's Active Compression test negative\par \par Left Shoulder\par ·	Inspection/Palpation: no tenderness, swelling or deformities\par ·	Range of Motion: full and painless in all planes, no crepitus\par ·	Strength: forward elevation in scapular plane 5/5, internal rotation 5/5, external rotation 5/5, adduction 5/5 and abduction 5/5\par ·	Stability: no joint instability on provocative testing\par ·	Tests: Moctezuma test negative, Neer sign negative, negative drop arm test secondary to pain, bear hug test negative, Napolean sign negative, cross arm adduction negative, lift off sign positive, hornblowers sign negative, speeds test negative, Yergason's test negative, no bicipital groove tenderness, Hummel's Active Compression test negative [de-identified] : The following radiographs were ordered and read by me during this patients visit. I reviewed each radiograph in detail with the patient and discussed the findings as highlighted below. \par \par 3 views of the right shoulder show calcium deposit at the RTC insertion. There is no fracture, no dislocation. There is no evidence of degenerative change in the glenohumeral and acromioclavicular joints with maintenance of the joint space. Otherwise unremarkable. Calcification noted at supraspinatus insertion.

## 2019-09-09 NOTE — DISCUSSION/SUMMARY
[de-identified] : Sherron is a 38 year old female who presents today for follow-up of her right sided shoulder pain and right elbow pain. She works as a  and feels her pain has progressively worsened to her posterior right shoulder > left and right elbow. She is unable to modify her activities due to work so she frequently works through the pain. \par \par The patient presents today greatly improved since last evaluation since performing PT alongside Mobic x 21 days at this time she is to continue HEP alongside Mobic. She is to try to decrease repetitive activities and use a neoprene elbow sleeve while working. We will see her for clinical re-assessment in 4-6 weeks. The pt agrees to the above plan and all questions were answered.\par

## 2019-10-15 ENCOUNTER — APPOINTMENT (OUTPATIENT)
Dept: FAMILY MEDICINE | Facility: CLINIC | Age: 39
End: 2019-10-15

## 2019-10-18 ENCOUNTER — APPOINTMENT (OUTPATIENT)
Dept: ORTHOPEDIC SURGERY | Facility: CLINIC | Age: 39
End: 2019-10-18
Payer: MEDICAID

## 2019-10-18 VITALS
BODY MASS INDEX: 40.82 KG/M2 | WEIGHT: 245 LBS | DIASTOLIC BLOOD PRESSURE: 87 MMHG | SYSTOLIC BLOOD PRESSURE: 145 MMHG | HEART RATE: 75 BPM | HEIGHT: 65 IN

## 2019-10-18 DIAGNOSIS — M75.31 CALCIFIC TENDINITIS OF RIGHT SHOULDER: ICD-10-CM

## 2019-10-18 PROCEDURE — 99213 OFFICE O/P EST LOW 20 MIN: CPT

## 2019-10-18 RX ORDER — MELOXICAM 7.5 MG/1
7.5 TABLET ORAL
Qty: 21 | Refills: 0 | Status: COMPLETED | COMMUNITY
Start: 2019-10-18 | End: 2019-11-08

## 2019-12-20 ENCOUNTER — APPOINTMENT (OUTPATIENT)
Dept: ORTHOPEDIC SURGERY | Facility: CLINIC | Age: 39
End: 2019-12-20

## 2020-03-02 ENCOUNTER — LABORATORY RESULT (OUTPATIENT)
Age: 40
End: 2020-03-02

## 2020-03-02 ENCOUNTER — APPOINTMENT (OUTPATIENT)
Dept: FAMILY MEDICINE | Facility: CLINIC | Age: 40
End: 2020-03-02
Payer: MEDICAID

## 2020-03-02 VITALS
DIASTOLIC BLOOD PRESSURE: 85 MMHG | HEART RATE: 78 BPM | BODY MASS INDEX: 46.98 KG/M2 | HEIGHT: 65 IN | WEIGHT: 282 LBS | SYSTOLIC BLOOD PRESSURE: 146 MMHG

## 2020-03-02 DIAGNOSIS — R35.1 NOCTURIA: ICD-10-CM

## 2020-03-02 DIAGNOSIS — R30.0 DYSURIA: ICD-10-CM

## 2020-03-02 PROCEDURE — 99214 OFFICE O/P EST MOD 30 MIN: CPT | Mod: 25

## 2020-03-02 PROCEDURE — 36415 COLL VENOUS BLD VENIPUNCTURE: CPT

## 2020-03-02 NOTE — PHYSICAL EXAM
[No Acute Distress] : no acute distress [Well Developed] : well developed [Well Nourished] : well nourished [Well-Appearing] : well-appearing [Normal Sclera/Conjunctiva] : normal sclera/conjunctiva [PERRL] : pupils equal round and reactive to light [EOMI] : extraocular movements intact [No JVD] : no jugular venous distention [Normal Outer Ear/Nose] : the outer ears and nose were normal in appearance [Normal Oropharynx] : the oropharynx was normal [Thyroid Normal, No Nodules] : the thyroid was normal and there were no nodules present [No Lymphadenopathy] : no lymphadenopathy [Supple] : supple [No Respiratory Distress] : no respiratory distress  [Clear to Auscultation] : lungs were clear to auscultation bilaterally [No Accessory Muscle Use] : no accessory muscle use [Regular Rhythm] : with a regular rhythm [Normal Rate] : normal rate  [Normal S1, S2] : normal S1 and S2 [No Murmur] : no murmur heard [No Carotid Bruits] : no carotid bruits [No Abdominal Bruit] : a ~M bruit was not heard ~T in the abdomen [No Varicosities] : no varicosities [Pedal Pulses Present] : the pedal pulses are present [No Extremity Clubbing/Cyanosis] : no extremity clubbing/cyanosis [No Edema] : there was no peripheral edema [No Palpable Aorta] : no palpable aorta [Soft] : abdomen soft [Non Tender] : non-tender [Non-distended] : non-distended [No HSM] : no HSM [Normal Bowel Sounds] : normal bowel sounds [No Masses] : no abdominal mass palpated [Normal Posterior Cervical Nodes] : no posterior cervical lymphadenopathy [Normal Anterior Cervical Nodes] : no anterior cervical lymphadenopathy [No CVA Tenderness] : no CVA  tenderness [No Joint Swelling] : no joint swelling [Grossly Normal Strength/Tone] : grossly normal strength/tone [No Spinal Tenderness] : no spinal tenderness [Coordination Grossly Intact] : coordination grossly intact [No Rash] : no rash [No Focal Deficits] : no focal deficits [Normal Gait] : normal gait [Deep Tendon Reflexes (DTR)] : deep tendon reflexes were 2+ and symmetric [Normal Insight/Judgement] : insight and judgment were intact [Normal Affect] : the affect was normal

## 2020-03-03 NOTE — ADDENDUM
[FreeTextEntry1] : I, Susan Chaney, acted solely as a scribe for Dr. Diaz on this date [3/2/2020].\par

## 2020-03-03 NOTE — END OF VISIT
[FreeTextEntry3] : All medical entries made by the Scribe were at my, Dr. Diaz's, direction and personally dictated by me on [3/2/2020]. I have reviewed the chart and agree that the record accurately reflects my personal performance of the history, physical exam, assessment and plan. I have also personally directed, reviewed, and agreed with the chart.\par

## 2020-03-03 NOTE — HISTORY OF PRESENT ILLNESS
[FreeTextEntry8] : Patient here today c/o urinary frequency for 1 month. She states that she is waking up at least 3x during the night to urinate. No burning or pressure reported. \par Her BP is elevated today. She denies chest pain, palpitations, dizziness, SOB, and LE edema. \par Patient has gained a total of 27 Ibs due to stress.

## 2020-03-03 NOTE — REVIEW OF SYSTEMS
[Frequency] : frequency [Negative] : Heme/Lymph [Recent Change In Weight] : ~T recent weight change [Nocturia] : nocturia [Dysuria] : no dysuria [FreeTextEntry2] : gained 27 Ibs

## 2020-03-16 ENCOUNTER — APPOINTMENT (OUTPATIENT)
Dept: FAMILY MEDICINE | Facility: CLINIC | Age: 40
End: 2020-03-16

## 2020-03-17 LAB
25(OH)D3 SERPL-MCNC: 16.2 NG/ML
ALBUMIN SERPL ELPH-MCNC: 4.1 G/DL
ALP BLD-CCNC: 63 U/L
ALT SERPL-CCNC: 7 U/L
ANION GAP SERPL CALC-SCNC: 14 MMOL/L
APPEARANCE: CLEAR
AST SERPL-CCNC: 14 U/L
BASOPHILS # BLD AUTO: 0.03 K/UL
BASOPHILS NFR BLD AUTO: 0.5 %
BILIRUB SERPL-MCNC: <0.2 MG/DL
BILIRUB UR QL STRIP: NORMAL
BILIRUBIN URINE: NEGATIVE
BLOOD URINE: ABNORMAL
BUN SERPL-MCNC: 7 MG/DL
CALCIUM SERPL-MCNC: 9.1 MG/DL
CHLORIDE SERPL-SCNC: 108 MMOL/L
CHOLEST SERPL-MCNC: 198 MG/DL
CHOLEST/HDLC SERPL: 5.1 RATIO
CLARITY UR: CLEAR
CO2 SERPL-SCNC: 22 MMOL/L
COLLECTION METHOD: NORMAL
COLOR: NORMAL
CREAT SERPL-MCNC: 0.86 MG/DL
EOSINOPHIL # BLD AUTO: 0.18 K/UL
EOSINOPHIL NFR BLD AUTO: 3 %
ESTIMATED AVERAGE GLUCOSE: 137 MG/DL
FERRITIN SERPL-MCNC: 8 NG/ML
FOLATE SERPL-MCNC: 7.4 NG/ML
GLUCOSE QUALITATIVE U: NEGATIVE
GLUCOSE SERPL-MCNC: 113 MG/DL
GLUCOSE UR-MCNC: NORMAL
HBA1C MFR BLD HPLC: 6.4 %
HCG UR QL: 0.2 EU/DL
HCT VFR BLD CALC: 37.2 %
HDLC SERPL-MCNC: 39 MG/DL
HGB BLD-MCNC: 10.7 G/DL
HGB UR QL STRIP.AUTO: NORMAL
IMM GRANULOCYTES NFR BLD AUTO: 0.2 %
IRON SATN MFR SERPL: 7 %
IRON SERPL-MCNC: 29 UG/DL
KETONES UR-MCNC: NORMAL
KETONES URINE: NEGATIVE
LDLC SERPL CALC-MCNC: 105 MG/DL
LEUKOCYTE ESTERASE UR QL STRIP: NORMAL
LEUKOCYTE ESTERASE URINE: NEGATIVE
LYMPHOCYTES # BLD AUTO: 1.92 K/UL
LYMPHOCYTES NFR BLD AUTO: 31.5 %
MAN DIFF?: NORMAL
MCHC RBC-ENTMCNC: 21.1 PG
MCHC RBC-ENTMCNC: 28.8 GM/DL
MCV RBC AUTO: 73.5 FL
MONOCYTES # BLD AUTO: 0.36 K/UL
MONOCYTES NFR BLD AUTO: 5.9 %
NEUTROPHILS # BLD AUTO: 3.59 K/UL
NEUTROPHILS NFR BLD AUTO: 58.9 %
NITRITE UR QL STRIP: NORMAL
NITRITE URINE: NEGATIVE
PH UR STRIP: 8.5
PH URINE: 8.5
PLATELET # BLD AUTO: 516 K/UL
POTASSIUM SERPL-SCNC: 4.5 MMOL/L
PROT SERPL-MCNC: 6.9 G/DL
PROT UR STRIP-MCNC: NORMAL
PROTEIN URINE: NEGATIVE
RBC # BLD: 5.06 M/UL
RBC # FLD: 16.8 %
SODIUM SERPL-SCNC: 145 MMOL/L
SP GR UR STRIP: 1.01
SPECIFIC GRAVITY URINE: 1.01
T4 FREE SERPL-MCNC: 1.3 NG/DL
TIBC SERPL-MCNC: 415 UG/DL
TRIGL SERPL-MCNC: 275 MG/DL
TSH SERPL-ACNC: 0.97 UIU/ML
UIBC SERPL-MCNC: 387 UG/DL
UROBILINOGEN URINE: NORMAL
VIT B12 SERPL-MCNC: 631 PG/ML
WBC # FLD AUTO: 6.09 K/UL

## 2020-06-11 ENCOUNTER — APPOINTMENT (OUTPATIENT)
Dept: FAMILY MEDICINE | Facility: CLINIC | Age: 40
End: 2020-06-11
Payer: MEDICAID

## 2020-06-11 VITALS
HEIGHT: 65 IN | WEIGHT: 284 LBS | BODY MASS INDEX: 47.32 KG/M2 | HEART RATE: 80 BPM | DIASTOLIC BLOOD PRESSURE: 80 MMHG | SYSTOLIC BLOOD PRESSURE: 130 MMHG

## 2020-06-11 DIAGNOSIS — U07.1 COVID-19: ICD-10-CM

## 2020-06-11 PROCEDURE — 99214 OFFICE O/P EST MOD 30 MIN: CPT

## 2020-06-11 NOTE — PHYSICAL EXAM
[No Acute Distress] : no acute distress [Well Nourished] : well nourished [Well Developed] : well developed [Well-Appearing] : well-appearing [PERRL] : pupils equal round and reactive to light [Normal Sclera/Conjunctiva] : normal sclera/conjunctiva [EOMI] : extraocular movements intact [Normal Outer Ear/Nose] : the outer ears and nose were normal in appearance [Normal Oropharynx] : the oropharynx was normal [No JVD] : no jugular venous distention [No Lymphadenopathy] : no lymphadenopathy [Supple] : supple [Thyroid Normal, No Nodules] : the thyroid was normal and there were no nodules present [No Respiratory Distress] : no respiratory distress  [No Accessory Muscle Use] : no accessory muscle use [Clear to Auscultation] : lungs were clear to auscultation bilaterally [Normal Rate] : normal rate  [Regular Rhythm] : with a regular rhythm [Normal S1, S2] : normal S1 and S2 [No Murmur] : no murmur heard [No Carotid Bruits] : no carotid bruits [No Abdominal Bruit] : a ~M bruit was not heard ~T in the abdomen [No Varicosities] : no varicosities [Pedal Pulses Present] : the pedal pulses are present [No Edema] : there was no peripheral edema [No Palpable Aorta] : no palpable aorta [No Extremity Clubbing/Cyanosis] : no extremity clubbing/cyanosis [Soft] : abdomen soft [Non Tender] : non-tender [Non-distended] : non-distended [No Masses] : no abdominal mass palpated [No HSM] : no HSM [Normal Bowel Sounds] : normal bowel sounds [Normal Posterior Cervical Nodes] : no posterior cervical lymphadenopathy [Normal Anterior Cervical Nodes] : no anterior cervical lymphadenopathy [No CVA Tenderness] : no CVA  tenderness [No Spinal Tenderness] : no spinal tenderness [No Joint Swelling] : no joint swelling [Grossly Normal Strength/Tone] : grossly normal strength/tone [No Rash] : no rash [No Focal Deficits] : no focal deficits [Normal Gait] : normal gait [Coordination Grossly Intact] : coordination grossly intact [Normal Insight/Judgement] : insight and judgment were intact [Deep Tendon Reflexes (DTR)] : deep tendon reflexes were 2+ and symmetric [Normal Affect] : the affect was normal

## 2020-06-11 NOTE — PLAN
[FreeTextEntry1] : # lab\par # covid tetsing\par # obesity- advise return to create plans\par \par follow up after lab in 2 weeks

## 2020-06-11 NOTE — HISTORY OF PRESENT ILLNESS
[FreeTextEntry1] : obesity/anemia  [de-identified] : feels okay\par gained lot of weight has been eating bad\par needs cOVID testing as she is returning to work\par wants anemia checked

## 2020-12-04 ENCOUNTER — TRANSCRIPTION ENCOUNTER (OUTPATIENT)
Age: 40
End: 2020-12-04

## 2021-04-15 ENCOUNTER — TRANSCRIPTION ENCOUNTER (OUTPATIENT)
Age: 41
End: 2021-04-15

## 2021-05-14 ENCOUNTER — APPOINTMENT (OUTPATIENT)
Dept: FAMILY MEDICINE | Facility: CLINIC | Age: 41
End: 2021-05-14
Payer: MEDICAID

## 2021-05-14 ENCOUNTER — LABORATORY RESULT (OUTPATIENT)
Age: 41
End: 2021-05-14

## 2021-05-14 VITALS
SYSTOLIC BLOOD PRESSURE: 130 MMHG | DIASTOLIC BLOOD PRESSURE: 90 MMHG | WEIGHT: 279 LBS | HEART RATE: 82 BPM | BODY MASS INDEX: 46.48 KG/M2 | TEMPERATURE: 97.7 F | HEIGHT: 65 IN

## 2021-05-14 DIAGNOSIS — H65.90 UNSPECIFIED NONSUPPURATIVE OTITIS MEDIA, UNSPECIFIED EAR: ICD-10-CM

## 2021-05-14 PROCEDURE — G0444 DEPRESSION SCREEN ANNUAL: CPT

## 2021-05-14 PROCEDURE — 99395 PREV VISIT EST AGE 18-39: CPT

## 2021-05-14 PROCEDURE — 99214 OFFICE O/P EST MOD 30 MIN: CPT | Mod: 25

## 2021-05-14 RX ORDER — FLUTICASONE PROPIONATE 50 UG/1
50 SPRAY, METERED NASAL
Qty: 1 | Refills: 2 | Status: ACTIVE | COMMUNITY
Start: 2021-05-14 | End: 1900-01-01

## 2021-05-14 RX ORDER — FEXOFENADINE HYDROCHLORIDE 180 MG/1
180 TABLET ORAL DAILY
Qty: 30 | Refills: 0 | Status: ACTIVE | COMMUNITY
Start: 2021-05-14 | End: 1900-01-01

## 2021-05-15 ENCOUNTER — LABORATORY RESULT (OUTPATIENT)
Age: 41
End: 2021-05-15

## 2021-05-18 LAB
25(OH)D3 SERPL-MCNC: 24.8 NG/ML
ALBUMIN SERPL ELPH-MCNC: 4.6 G/DL
ALP BLD-CCNC: 65 U/L
ALT SERPL-CCNC: 12 U/L
ANION GAP SERPL CALC-SCNC: 9 MMOL/L
AST SERPL-CCNC: 14 U/L
BASOPHILS # BLD AUTO: 0.02 K/UL
BASOPHILS NFR BLD AUTO: 0.4 %
BILIRUB SERPL-MCNC: 0.2 MG/DL
BUN SERPL-MCNC: 11 MG/DL
CALCIUM SERPL-MCNC: 9.7 MG/DL
CHLORIDE SERPL-SCNC: 105 MMOL/L
CHOLEST SERPL-MCNC: 191 MG/DL
CO2 SERPL-SCNC: 26 MMOL/L
CREAT SERPL-MCNC: 0.83 MG/DL
EOSINOPHIL # BLD AUTO: 0.19 K/UL
EOSINOPHIL NFR BLD AUTO: 4.1 %
ESTIMATED AVERAGE GLUCOSE: 128 MG/DL
GLUCOSE SERPL-MCNC: 109 MG/DL
HBA1C MFR BLD HPLC: 6.1 %
HCT VFR BLD CALC: 41.9 %
HDLC SERPL-MCNC: 39 MG/DL
HGB BLD-MCNC: 12.9 G/DL
IMM GRANULOCYTES NFR BLD AUTO: 0 %
LDLC SERPL CALC-MCNC: 124 MG/DL
LYMPHOCYTES # BLD AUTO: 1.64 K/UL
LYMPHOCYTES NFR BLD AUTO: 35.3 %
MAN DIFF?: NORMAL
MCHC RBC-ENTMCNC: 25 PG
MCHC RBC-ENTMCNC: 30.8 GM/DL
MCV RBC AUTO: 81.2 FL
MONOCYTES # BLD AUTO: 0.31 K/UL
MONOCYTES NFR BLD AUTO: 6.7 %
NEUTROPHILS # BLD AUTO: 2.49 K/UL
NEUTROPHILS NFR BLD AUTO: 53.5 %
NONHDLC SERPL-MCNC: 153 MG/DL
PLATELET # BLD AUTO: 422 K/UL
POTASSIUM SERPL-SCNC: 4.3 MMOL/L
PROT SERPL-MCNC: 7.4 G/DL
RBC # BLD: 5.16 M/UL
RBC # FLD: 14.6 %
SODIUM SERPL-SCNC: 140 MMOL/L
TRIGL SERPL-MCNC: 144 MG/DL
TSH SERPL-ACNC: 0.43 UIU/ML
WBC # FLD AUTO: 4.65 K/UL

## 2021-05-19 NOTE — ASSESSMENT
[FreeTextEntry1] : ISABELLA GAINES is a 40 year old female patient presenting to the clinic today for CPE\par \par #CPE\par # PHQ-2 Behavioral Health Screenin\par # headaches radiating to the left side of her neck, possibly migraines or allergies\par # Ears Examined: slight fluid in the ear \par # frequent urinating\par # discuss of diet and weight management

## 2021-05-19 NOTE — PLAN
[FreeTextEntry1] : # fasting blood work today \par # order UA \par # Start Flonase \par # Start Rx Allegra \par # advise to use steam inhalation\par # advise to stop using Q Tips to clean ears\par # Diet Management: \par - Choose lean meats/poultry w/o skin and/or saturated and trans fat.\par - Eat fish at least 2x week. Research shows that eating oily fish containing omega-3,fatty acids (EX: salmon, trout and herring) \par may help lower your risk of death from coronary artery disease.\par - Select fat-free, 1% fat and low-fat dairy products.\par - Cut back on foods containing partially hydrogenated vegetable oils to reduce trans fat in your diet. \par - To lower cholesterol, reduce saturated fat to no more than 5-6% of total calories. ~13 g saturated fat in 2,000 bev/day diet\par - Cut back on beverages and foods with added sugars. \par - Choose and prepare foods with little or no salt. To lower blood pressure, aim to eat no more than 2,400 milligrams of sodium per day, GOAL: 1,500 mg sodium/day maximum\par - Drink alcohol in moderation: 1 drink/day for women, 2 drinks/day for men \par *Follow the American Heart Association recommendations when you eat out, and monitor portion sizes\par #f/u in a month or sooner if needed

## 2021-05-19 NOTE — HISTORY OF PRESENT ILLNESS
[FreeTextEntry1] : CPE [de-identified] : ISABELLA GAINES is a 40 year old female patient presenting to the clinic today for CPE. Mood is normal,appears well. She states last month she woke up with a headache radiating to the left side of her neck. She had sensitivity to light. Possibly thinks its from BP or allergies. Possibly migraines. She took Excedrin Tension headache medicine, slight relief. \par She has been doing exercises for her shoulder, has been working.\par She also c/o earaches. She uses Q Tips to clean her ears. \par She also reports of frequent urinating , no fever or back pain\par

## 2021-05-19 NOTE — HEALTH RISK ASSESSMENT
[Good] : ~his/her~  mood as  good [No falls in past year] : Patient reported no falls in the past year [[id number]] : 0

## 2021-05-19 NOTE — END OF VISIT
[FreeTextEntry3] : This note was written by Kiana Crawford on 05/14/2021, acting as a scribe for Dr. Donna MD.\par \par All medical record entries were at my, Dr. Shannon Thompson MD, direction and personally dictated by me in 05/14/2021. I have personally reviewed the chart and agree that the record accurately reflects my personal performance of the history, physical exam, assessment, and plan.\par

## 2021-06-07 ENCOUNTER — APPOINTMENT (OUTPATIENT)
Dept: FAMILY MEDICINE | Facility: CLINIC | Age: 41
End: 2021-06-07
Payer: MEDICAID

## 2021-06-07 VITALS
SYSTOLIC BLOOD PRESSURE: 140 MMHG | DIASTOLIC BLOOD PRESSURE: 90 MMHG | BODY MASS INDEX: 46.32 KG/M2 | WEIGHT: 278 LBS | TEMPERATURE: 97.6 F | HEART RATE: 72 BPM | HEIGHT: 65 IN

## 2021-06-07 PROCEDURE — 99214 OFFICE O/P EST MOD 30 MIN: CPT | Mod: 25

## 2021-06-07 PROCEDURE — G0447 BEHAVIOR COUNSEL OBESITY 15M: CPT

## 2021-06-07 NOTE — COUNSELING
[Potential consequences of obesity discussed] : Potential consequences of obesity discussed [Benefits of weight loss discussed] : Benefits of weight loss discussed [Encouraged to maintain food diary] : Encouraged to maintain food diary [Encouraged to increase physical activity] : Encouraged to increase physical activity [Encouraged to use exercise tracking device] : Encouraged to use exercise tracking device [FreeTextEntry4] : 10+

## 2021-06-07 NOTE — HISTORY OF PRESENT ILLNESS
[FreeTextEntry1] : Follow up  [de-identified] : ISABELLA GAINES is a 40 year old female patient presenting to the clinic today for follow up and blood work results. Mood is normal, appears well. She is unsure if she wants to get on cholesterol medicine, she will diet. Has no new complaints/concerns. \par

## 2021-06-07 NOTE — PLAN
[FreeTextEntry1] : # advise to increase water intake \par # advise to continue to take Vitamin D \par # advise to get a nutritionist \par # advise to eat low salt diet for HTN \par # advise to go for walks and exercise for weight loss \par # Diet Management: \par - Choose lean meats/poultry w/o skin and/or saturated and trans fat.\par - Eat fish at least 2x week. Research shows that eating oily fish containing omega-3,fatty acids (EX: salmon, trout and herring) \par may help lower your risk of death from coronary artery disease.\par - Select fat-free, 1% fat and low-fat dairy products.\par - Cut back on foods containing partially hydrogenated vegetable oils to reduce trans fat in your diet. \par - To lower cholesterol, reduce saturated fat to no more than 5-6% of total calories. ~13 g saturated fat in 2,000 bev/day diet\par - Cut back on beverages and foods with added sugars. \par - Choose and prepare foods with little or no salt. To lower blood pressure, aim to eat no more than 2,400 milligrams of sodium per day, GOAL: 1,500 mg sodium/day maximum\par - Drink alcohol in moderation: 1 drink/day for women, 2 drinks/day for men \par *Follow the American Heart Association recommendations when you eat out, and monitor portion sizes\par # f/u a month or sooner if needed\par

## 2021-06-07 NOTE — ASSESSMENT
[FreeTextEntry1] : ISABELLA GAINES is a 40 year old female patient presenting to the clinic today for follow up and blood work results. \par \par # PE/Vitals- loss about 7 lbs\par # HTN- slightly elevated, uncontrolled \par # review of last blood work results\par - prediabetes, A1c 6.1 \par - Cholesterol slightly elevated, borderline \par - Vitamin levels slightly low \par # discuss of diet and weight management \par \par

## 2021-06-07 NOTE — END OF VISIT
[FreeTextEntry3] : This note was written by Kiana Crawford on 06/07/2021, acting as a scribe for Dr. Donna MD.\par \par All medical record entries were at my, Dr. Shannon Thompson MD, direction and personally dictated by me in 06/07/2021. I have personally reviewed the chart and agree that the record accurately reflects my personal performance of the history, physical exam, assessment, and plan.\par

## 2021-06-15 ENCOUNTER — NON-APPOINTMENT (OUTPATIENT)
Age: 41
End: 2021-06-15

## 2021-06-15 ENCOUNTER — APPOINTMENT (OUTPATIENT)
Dept: ORTHOPEDIC SURGERY | Facility: CLINIC | Age: 41
End: 2021-06-15
Payer: MEDICAID

## 2021-06-15 VITALS
HEIGHT: 65 IN | HEART RATE: 76 BPM | WEIGHT: 278 LBS | BODY MASS INDEX: 46.32 KG/M2 | SYSTOLIC BLOOD PRESSURE: 136 MMHG | DIASTOLIC BLOOD PRESSURE: 82 MMHG

## 2021-06-15 DIAGNOSIS — M77.8 OTHER ENTHESOPATHIES, NOT ELSEWHERE CLASSIFIED: ICD-10-CM

## 2021-06-15 PROCEDURE — 99213 OFFICE O/P EST LOW 20 MIN: CPT

## 2021-06-15 RX ORDER — DICLOFENAC SODIUM 1% 10 MG/G
1 GEL TOPICAL
Qty: 1 | Refills: 0 | Status: ACTIVE | COMMUNITY
Start: 2021-06-15 | End: 1900-01-01

## 2021-06-15 RX ORDER — MELOXICAM 15 MG/1
15 TABLET ORAL DAILY
Qty: 14 | Refills: 0 | Status: ACTIVE | COMMUNITY
Start: 2021-06-15 | End: 1900-01-01

## 2021-06-16 NOTE — PHYSICAL EXAM
[de-identified] : GENERAL: Awake, alert, cooperative, answers questions appropriately. No acute distress.  Ambulates independently with a normal gait.\par SKIN: Warm, dry, intact. Color and turgor normal. \par LUNGS: Demonstrates unlabored breathing on room air with no accessory muscle use.\par EXTREMITIES: Warm and well-perfused. \par NEUROLOGICAL: Grossly intact.\par \par FOCUSED UPPER EXTREMITY EXAM:\par \par LUE:\par -skin intact without any erythema, ecchymosis, or swelling; normal finger cascade without malrotation\par -mild tenderness to palpation over distal FCU and over pisiform with mild swelling; discomfort with resisted wrist flexion\par -no thenar atrophy; no intrinsics wasting; 5/5 strength thumb opposition; 5/5 strength intrinsics\par -no tenderness to palpation over the A1 pulleys\par -no tenderness to palpation along first dorsal wrist compartment\par -no tenderness to palpation at the base of the thumb\par -no tenderness to palpation at the anatomic snuffbox\par -no pain with passive thumb circumduction with negative grind test\par -no pain with resisted thumb extension with negative Finkelstein test\par -no tenderness to palpation in the SL interval; negative scaphoid shift with gentle passive motion\par -no tenderness at the fovea\par -able to make full fist, free AROM in all fingers, no scissoring\par -decreased wrist ROM; full forearm supination/pronation; no ECU subluxation; DRUJ stable and nontender\par -negative carpal tunnel compression test, negative Phalen's, negative Tinel's at the wrist\par -no ulnar nerve subluxation at elbow with gentle PROM; negative cubital tunnel compression test; negative Tinel's at elbow\par -sensation intact in radial, ulnar, and median nerve distributions\par -Brisk capillary refill, fingers warm well perfused\par

## 2021-06-16 NOTE — REVIEW OF SYSTEMS
[Joint Pain] : joint pain [Negative] : Allergic/Immunologic [Right] : right [Joint Stiffness] : joint stiffness [Fatigue] : fatigue [Feeling Weak] : feeling weak

## 2021-06-16 NOTE — DISCUSSION/SUMMARY
[FreeTextEntry1] : 40F with left ulnar wrist pain, exam suggested FCU tendinitis\par \par -We discussed in detail the clinical findings\par -I refereed her to OT for ROM, pain control, edema control, modalities, strengthening, home exercises.  Tendinitis protocol.  WBAT.\par -I prescribed a short course of meloxicam daily for two weeks.  We discussed the GI and renal side effects of NSAIDs.\par -I prescribed transdermal pain gel BID as needed for symptom relief\par -I recommended activity modification and wear a wrist brace as needed\par -We discussed that if the above measures still could not relieve her symptoms, we can attempt a steroid injection at the most symptomatic site\par -I will see her as needed\par -Patient had the opportunity to ask questions and she was in agreement with the plan\par -Over 50% of the time spent with the patient was on counseling the patient on the above diagnosis, treatment plan and prognosis.\par

## 2021-06-16 NOTE — HISTORY OF PRESENT ILLNESS
[FreeTextEntry1] : 06/15/2021\par Ms. ISABELLA GAINES returns for follow up.  She has been having shooting pain in the left volar ulnar wrist for a few weeks and would like to have it checked.\par \par 07/02/2019\par Ms. ISABELLA GAINES is a pleasant 38 year old female, RHD, .  \par \par She presents to the office for evaluation of bilateral hand "tightness" and pain in between the thumb and index finger, left worse than the right.\par She has had these symptoms for about six months.\par \par She denies prior injury.\par Currently her pain is intermittent, achy, without radiation.\par She denies paresthesia.\par She denies night symptoms.\par Symptoms improve with rest and immobilization.  She had used thumb spica braces before which helped\par Symptoms worsen with activity.\par \par She is not diabetic.\par She denies history of thyroid disease.\par She denies current tobacco use.\par She denies recreational drug use.\par She does not take any prescription pain medication.\par

## 2021-07-12 ENCOUNTER — APPOINTMENT (OUTPATIENT)
Dept: FAMILY MEDICINE | Facility: CLINIC | Age: 41
End: 2021-07-12

## 2022-03-28 ENCOUNTER — APPOINTMENT (OUTPATIENT)
Dept: FAMILY MEDICINE | Facility: CLINIC | Age: 42
End: 2022-03-28

## 2022-06-06 ENCOUNTER — APPOINTMENT (OUTPATIENT)
Dept: FAMILY MEDICINE | Facility: CLINIC | Age: 42
End: 2022-06-06
Payer: MEDICAID

## 2022-06-06 VITALS
OXYGEN SATURATION: 98 % | HEART RATE: 86 BPM | SYSTOLIC BLOOD PRESSURE: 136 MMHG | BODY MASS INDEX: 47.32 KG/M2 | HEIGHT: 65 IN | WEIGHT: 284 LBS | DIASTOLIC BLOOD PRESSURE: 92 MMHG

## 2022-06-06 PROCEDURE — G0447 BEHAVIOR COUNSEL OBESITY 15M: CPT

## 2022-06-06 PROCEDURE — 99395 PREV VISIT EST AGE 18-39: CPT | Mod: 25

## 2022-06-06 PROCEDURE — 99213 OFFICE O/P EST LOW 20 MIN: CPT | Mod: 25

## 2022-06-06 PROCEDURE — G0444 DEPRESSION SCREEN ANNUAL: CPT | Mod: 59

## 2022-06-06 RX ORDER — PREDNISONE 20 MG/1
20 TABLET ORAL DAILY
Qty: 10 | Refills: 0 | Status: DISCONTINUED | COMMUNITY
Start: 2018-11-30 | End: 2022-06-06

## 2022-06-06 RX ORDER — AMOXICILLIN 500 MG/1
500 CAPSULE ORAL
Qty: 21 | Refills: 0 | Status: DISCONTINUED | COMMUNITY
Start: 2022-04-04

## 2022-06-06 RX ORDER — AZITHROMYCIN 250 MG/1
250 TABLET, FILM COATED ORAL
Qty: 6 | Refills: 0 | Status: DISCONTINUED | COMMUNITY
Start: 2018-11-30 | End: 2022-06-06

## 2022-06-07 LAB
25(OH)D3 SERPL-MCNC: 23.2 NG/ML
ALBUMIN SERPL ELPH-MCNC: 4.4 G/DL
ALP BLD-CCNC: 73 U/L
ALT SERPL-CCNC: 12 U/L
ANION GAP SERPL CALC-SCNC: 16 MMOL/L
APPEARANCE: CLEAR
AST SERPL-CCNC: 12 U/L
BASOPHILS # BLD AUTO: 0.03 K/UL
BASOPHILS NFR BLD AUTO: 0.5 %
BILIRUB SERPL-MCNC: <0.2 MG/DL
BILIRUBIN URINE: NEGATIVE
BLOOD URINE: NEGATIVE
BUN SERPL-MCNC: 8 MG/DL
CALCIUM SERPL-MCNC: 9 MG/DL
CHLORIDE SERPL-SCNC: 106 MMOL/L
CHOLEST SERPL-MCNC: 206 MG/DL
CO2 SERPL-SCNC: 21 MMOL/L
COLOR: NORMAL
CREAT SERPL-MCNC: 0.74 MG/DL
EGFR: 105 ML/MIN/1.73M2
EOSINOPHIL # BLD AUTO: 0.21 K/UL
EOSINOPHIL NFR BLD AUTO: 3.7 %
ESTIMATED AVERAGE GLUCOSE: 137 MG/DL
FERRITIN SERPL-MCNC: 12 NG/ML
FOLATE SERPL-MCNC: 6.4 NG/ML
GLUCOSE QUALITATIVE U: NEGATIVE
GLUCOSE SERPL-MCNC: 109 MG/DL
HBA1C MFR BLD HPLC: 6.4 %
HCT VFR BLD CALC: 39.5 %
HDLC SERPL-MCNC: 43 MG/DL
HGB BLD-MCNC: 11.7 G/DL
IMM GRANULOCYTES NFR BLD AUTO: 0.2 %
IRON SATN MFR SERPL: 6 %
IRON SERPL-MCNC: 23 UG/DL
KETONES URINE: NEGATIVE
LDLC SERPL CALC-MCNC: 137 MG/DL
LEUKOCYTE ESTERASE URINE: NEGATIVE
LYMPHOCYTES # BLD AUTO: 1.83 K/UL
LYMPHOCYTES NFR BLD AUTO: 32.1 %
MAN DIFF?: NORMAL
MCHC RBC-ENTMCNC: 23.4 PG
MCHC RBC-ENTMCNC: 29.6 GM/DL
MCV RBC AUTO: 79.2 FL
MONOCYTES # BLD AUTO: 0.34 K/UL
MONOCYTES NFR BLD AUTO: 6 %
NEUTROPHILS # BLD AUTO: 3.28 K/UL
NEUTROPHILS NFR BLD AUTO: 57.5 %
NITRITE URINE: NEGATIVE
NONHDLC SERPL-MCNC: 163 MG/DL
PH URINE: 5.5
PLATELET # BLD AUTO: 479 K/UL
POTASSIUM SERPL-SCNC: 4.3 MMOL/L
PROT SERPL-MCNC: 7.4 G/DL
PROTEIN URINE: NORMAL
RBC # BLD: 4.99 M/UL
RBC # FLD: 15.9 %
SODIUM SERPL-SCNC: 142 MMOL/L
SPECIFIC GRAVITY URINE: 1.01
TIBC SERPL-MCNC: 411 UG/DL
TRIGL SERPL-MCNC: 129 MG/DL
TSH SERPL-ACNC: 0.97 UIU/ML
UIBC SERPL-MCNC: 388 UG/DL
UROBILINOGEN URINE: NORMAL
VIT B12 SERPL-MCNC: 609 PG/ML
WBC # FLD AUTO: 5.7 K/UL

## 2022-06-07 NOTE — HISTORY OF PRESENT ILLNESS
[FreeTextEntry1] : CPE [de-identified] : ISABELLA GAINES is a 41 year old female patient presenting to the clinic today for CPE. Mood is normal, appears well. Patient blood pressure is slightly elevated today in the office 136/92. Notes she does not eat a lot of salt. Has gained about 6 lbs since last visit. She knows she has to lose weight, does not want to be on HTN medication. \par \par Additionally, patient wants to get tested for allergies, as she is experiencing some symptoms. Has no changes in medical history.

## 2022-06-07 NOTE — COUNSELING
[Potential consequences of obesity discussed] : Potential consequences of obesity discussed [Benefits of weight loss discussed] : Benefits of weight loss discussed [Encouraged to maintain food diary] : Encouraged to maintain food diary [Encouraged to increase physical activity] : Encouraged to increase physical activity [Encouraged to use exercise tracking device] : Encouraged to use exercise tracking device [FreeTextEntry4] : 3+

## 2022-06-07 NOTE — END OF VISIT
[FreeTextEntry3] : This note was written by Kiana Crawford on 06/06/2022, acting as a scribe for Dr. Donna MD.\par \par All medical record entries were at my, Dr. Shannon Thompson MD, direction and personally dictated by me in 06/06/2022. I have personally reviewed the chart and agree that the record accurately reflects my personal performance of the history, physical exam, assessment, and plan.\par

## 2022-06-07 NOTE — HEALTH RISK ASSESSMENT
[Good] : ~his/her~  mood as  good [Never] : Never [No] : No [No falls in past year] : Patient reported no falls in the past year [PHQ-2 Negative - No further assessment needed] : PHQ-2 Negative - No further assessment needed [PHQ-9 Negative - No further assessment needed] : PHQ-9 Negative - No further assessment needed [[id number]] : 0

## 2022-06-07 NOTE — PLAN
[FreeTextEntry1] : # fasting blood work today \par # advised to have a strict diet and exercise to help with weight loss \par # advised to have a low sodium diet\par # f/u in three months or sooner if needed \par \par Diet Management: \par - Choose lean meats/poultry w/o skin and/or saturated and trans fat.\par - Eat fish at least 2x week. Research shows that eating oily fish containing omega-3,fatty acids (EX: salmon, trout and herring) \par may help lower your risk of death from coronary artery disease.\par - Select fat-free, 1% fat and low-fat dairy products.\par - Cut back on foods containing partially hydrogenated vegetable oils to reduce trans fat in your diet. \par - To lower cholesterol, reduce saturated fat to no more than 5-6% of total calories. ~13 g saturated fat in 2,000 bev/day diet\par - Cut back on beverages and foods with added sugars. \par - Choose and prepare foods with little or no salt. To lower blood pressure, aim to eat no more than 2,400 milligrams of sodium per day, GOAL: 1,500 mg sodium/day maximum\par - Drink alcohol in moderation: 1 drink/day for women, 2 drinks/day for men \par *Follow the American Heart Association recommendations when you eat out, and monitor portion sizes\par

## 2022-06-07 NOTE — ASSESSMENT
[FreeTextEntry1] : ISABELLA GAINES is a 41 year old female patient presenting to the clinic today for CPE. \par \par # PE/Vitals\par - stable \par \par # PHQ-2 Behavioral Health Screenin\par # Reviewed Patient Medical History \par \par # Morbid Obesity\par - advised pt to diet and exercise

## 2022-06-21 ENCOUNTER — APPOINTMENT (OUTPATIENT)
Dept: FAMILY MEDICINE | Facility: CLINIC | Age: 42
End: 2022-06-21
Payer: MEDICAID

## 2022-06-21 VITALS
DIASTOLIC BLOOD PRESSURE: 98 MMHG | BODY MASS INDEX: 47.48 KG/M2 | WEIGHT: 285 LBS | SYSTOLIC BLOOD PRESSURE: 146 MMHG | HEIGHT: 65 IN | OXYGEN SATURATION: 98 % | HEART RATE: 102 BPM

## 2022-06-21 VITALS — WEIGHT: 288 LBS | BODY MASS INDEX: 47.93 KG/M2

## 2022-06-21 PROCEDURE — G0447 BEHAVIOR COUNSEL OBESITY 15M: CPT

## 2022-06-21 PROCEDURE — 99214 OFFICE O/P EST MOD 30 MIN: CPT | Mod: 25

## 2022-06-21 NOTE — END OF VISIT
[FreeTextEntry3] : This note was written by Dawna Mohr on 06/21/2022, acting as a scribe for MD Myriam.\par \par All medic record entries were at my, Dr.Meenu Donna MD, direction and personally dictated by me in 06/21/2022. I have personally reviewed the chart and agree that the record accurately reflects my personal performance of the history, physical exam, assessment, and plan.

## 2022-06-21 NOTE — HISTORY OF PRESENT ILLNESS
[FreeTextEntry1] : Follow-up [de-identified] : ISABELLA GAINES is a 41 year old female patient presenting to the clinic today for follow-up. Mood is good and appears well. Patient has been cutting back on her portions and started to increase her vegetable intake by eating more salads but she does use salad dressing. She recently went to a baby shower a few weeks ago and ate cake and other foods she believes may be reason for weight gain. Patient understands she needs to change her diet and lifestyle to control her diabetes, blood pressure, and cholesterol levels. She plans to join a [employer] program to help her lose weight.

## 2022-06-21 NOTE — ASSESSMENT
[FreeTextEntry1] : ISABELLA GAINES is a 41 year old female patient presenting to the clinic today for follow-up.\par \par #PE/Vitals\par - elevated blood pressure 146/98 \par \par #Reviewed Lab Results\par - elevated  K/uL\par - elevated A1c 6.4\par - low ferritin level 12 ng/mL\par - low vitamin D level 23.2 ng/mL\par - elevated cholesterol level 206 mg/dL\par - low serum iron level 23 ug/dL\par \par #DM\par - will possibly start medication next visit if pt does not lose weight by next visit\par \par #HLD\par - will possibly start medication next visit if diet and exercise does not control cholesterol level\par \par #HTN\par - will possibly start medication next visit if diet and exercise does not lower bp \par \par #Discussed Risks of Uncontrolled Diabetes\par - increase risk of stroke\par - issues with eyes and legs \par \par #Discussed Diabetic Diet\par - for pt height her daily calories intake should be about 0665-9732 calories\par

## 2022-06-21 NOTE — PLAN
[FreeTextEntry1] : # advised to stop drinking soda and other sugary drinks \par # advised to decrease carb intake\par # advised to eat berries as snack\par # advised to increase water intake\par # advised to eat smaller portions \par # advised to increase vegetable intake \par # advised to keep a food log to keep track of how many calories she eats a day\par # advised to increase physical activity \par # advised to join a weight loss program\par # f/u in six weeks or sooner if needed \par \par Diet Management:\par - Choose lean meats/poultry w/o skin and/or saturated and trans fat\par - Eat fish at least 2x week. Research shows that eating oily fish containing omega-3, fatty acids ( EX; salmon, trout. herring)\par may lower your risk of death from coronary artery disease\par -Select fat- free, 1% fat and low- fat dairy products\par - Cut back on foods containing partially hydrogenated vegetable oils to reduce trans fat in your diet\par - To lower cholestrol, reduce saturated fat to no more than 5-6% of total calories. ~13 g saturated fat in 2,000 bev/day diet\par - Cut back on beverages and foods with added sugars\par - Choose and prepare foods with little or no salt. To lower blood pressure, aim to eat no more than 2,400 milligrams of sodium per day, GOAL: 1,500 mg sodium/day maximum\par - Drink alcohol in moderation: 1 drink/day for women; 2 drinks/day for men\par \par * Follow the American Heart Association recommendations when you eat out and monitor portion sizes.

## 2022-06-21 NOTE — COUNSELING
[Potential consequences of obesity discussed] : Potential consequences of obesity discussed [Benefits of weight loss discussed] : Benefits of weight loss discussed [Structured Weight Management Program suggested:] : Structured weight management program suggested [Encouraged to maintain food diary] : Encouraged to maintain food diary [Encouraged to increase physical activity] : Encouraged to increase physical activity [Encouraged to use exercise tracking device] : Encouraged to use exercise tracking device [FreeTextEntry4] : 20+

## 2022-07-25 ENCOUNTER — APPOINTMENT (OUTPATIENT)
Dept: FAMILY MEDICINE | Facility: CLINIC | Age: 42
End: 2022-07-25

## 2022-07-25 ENCOUNTER — NON-APPOINTMENT (OUTPATIENT)
Age: 42
End: 2022-07-25

## 2022-07-25 VITALS
HEART RATE: 96 BPM | WEIGHT: 280 LBS | HEIGHT: 65 IN | SYSTOLIC BLOOD PRESSURE: 138 MMHG | DIASTOLIC BLOOD PRESSURE: 86 MMHG | OXYGEN SATURATION: 98 % | BODY MASS INDEX: 46.65 KG/M2

## 2022-07-25 PROCEDURE — 99214 OFFICE O/P EST MOD 30 MIN: CPT | Mod: 25

## 2022-07-25 PROCEDURE — G0447 BEHAVIOR COUNSEL OBESITY 15M: CPT

## 2022-07-25 RX ORDER — TOPIRAMATE 25 MG/1
25 TABLET, FILM COATED ORAL TWICE DAILY
Qty: 60 | Refills: 0 | Status: DISCONTINUED | COMMUNITY
Start: 2019-01-25 | End: 2022-07-25

## 2022-07-25 RX ORDER — PHENTERMINE HYDROCHLORIDE 15 MG/1
15 CAPSULE ORAL
Qty: 30 | Refills: 0 | Status: DISCONTINUED | COMMUNITY
Start: 2019-01-25 | End: 2022-07-25

## 2022-07-25 NOTE — REVIEW OF SYSTEMS
[Negative] : Heme/Lymph [Chest Pain] : no chest pain [Palpitations] : no palpitations [Shortness Of Breath] : no shortness of breath [Headache] : no headache [Dizziness] : no dizziness

## 2022-07-25 NOTE — PLAN
[FreeTextEntry1] : # advised to continue to diet and exercise to help with weight loss\par # advised to increase vegetable intake\par # advised to continue to cut back on bread, pasta, rice, potatoes to help with weight loss\par # will repeat blood work next visit\par # f/u in September or sooner if needed\par \par Diet Management: \par - Choose lean meats/poultry w/o skin and/or saturated and trans fat.\par - Eat fish at least 2x week. Research shows that eating oily fish containing omega-3,fatty acids (EX: salmon, trout and herring) \par may help lower your risk of death from coronary artery disease.\par - Select fat-free, 1% fat and low-fat dairy products.\par - Cut back on foods containing partially hydrogenated vegetable oils to reduce trans fat in your diet. \par - To lower cholesterol, reduce saturated fat to no more than 5-6% of total calories. ~13 g saturated fat in 2,000 bev/day diet\par - Cut back on beverages and foods with added sugars. \par - Choose and prepare foods with little or no salt. To lower blood pressure, aim to eat no more than 2,400 milligrams of sodium per day, GOAL: 1,500 mg sodium/day maximum\par - Drink alcohol in moderation: 1 drink/day for women, 2 drinks/day for men \par *Follow the American Heart Association recommendations when you eat out, and monitor portion sizes\par

## 2022-07-25 NOTE — HISTORY OF PRESENT ILLNESS
[FreeTextEntry1] : Follow up  [de-identified] : ISABELLA GAINES is a 41 year old female patient presenting to the clinic today for follow up. Mood is normal, appears well. Patient blood pressure is upper limit of normal 138/86. Denies having any SOB, dizziness, headache, palpitations or chest pains. \par \par Additionally, patient has loss about 5 lbs since last visit. Has been going out more for walks and cut back on sodas intake. Has been eating low carb just vegetables and protein. Has tried the weight loss medication Phentermine in the past. She is experiencing fatigue but possible thinks its from her weight. Breathing is doing well.

## 2022-07-25 NOTE — ASSESSMENT
[FreeTextEntry1] : ISABELLA GAINES is a 41 year old female patient presenting to the clinic today for follow up. \par \par # PE/Vitals\par - upper limit of normal of blood pressure 138/86\par \par # Reviewed and Discussed Last Lab Results \par - hemoglobin border line low\par - A1c 6.4 \par - iron levels slightly low\par - Vitamin D slightly low\par \par # Discussed of diet and weight management\par # HTN improved- on diet

## 2022-07-25 NOTE — END OF VISIT
[FreeTextEntry3] : This note was written by Kiana Crawford on 07/25/2022, acting as a scribe for Dr. Donna MD.\par \par All medical record entries were at my, Dr. Shannon Thompson MD, direction and personally dictated by me in 07/25/2022. I have personally reviewed the chart and agree that the record accurately reflects my personal performance of the history, physical exam, assessment, and plan.\par

## 2022-08-29 ENCOUNTER — NON-APPOINTMENT (OUTPATIENT)
Age: 42
End: 2022-08-29

## 2022-09-19 ENCOUNTER — APPOINTMENT (OUTPATIENT)
Dept: FAMILY MEDICINE | Facility: CLINIC | Age: 42
End: 2022-09-19

## 2022-09-19 VITALS
OXYGEN SATURATION: 98 % | HEART RATE: 84 BPM | HEIGHT: 65 IN | SYSTOLIC BLOOD PRESSURE: 130 MMHG | DIASTOLIC BLOOD PRESSURE: 88 MMHG | BODY MASS INDEX: 47.48 KG/M2 | WEIGHT: 285 LBS

## 2022-09-19 PROCEDURE — G0447 BEHAVIOR COUNSEL OBESITY 15M: CPT

## 2022-09-19 PROCEDURE — 99214 OFFICE O/P EST MOD 30 MIN: CPT | Mod: 25

## 2022-09-19 PROCEDURE — 36415 COLL VENOUS BLD VENIPUNCTURE: CPT

## 2022-09-19 NOTE — PLAN
[FreeTextEntry1] : # fasting blood work today \par # advised patient to eat small portions to help with weight loss \par # advised to exercise and go for walk to help with weight loss\par # advised to increase vegetable intake \par # advised to take weight everyday at home during morning time \par # f/u in three months or sooner if needed\par \par Diet Management: \par - Choose lean meats/poultry w/o skin and/or saturated and trans fat.\par - Eat fish at least 2x week. Research shows that eating oily fish containing omega-3,fatty acids (EX: salmon, trout and herring) \par may help lower your risk of death from coronary artery disease.\par - Select fat-free, 1% fat and low-fat dairy products.\par - Cut back on foods containing partially hydrogenated vegetable oils to reduce trans fat in your diet. \par - To lower cholesterol, reduce saturated fat to no more than 5-6% of total calories. ~13 g saturated fat in 2,000 bev/day diet\par - Cut back on beverages and foods with added sugars. \par - Choose and prepare foods with little or no salt. To lower blood pressure, aim to eat no more than 2,400 milligrams of sodium per day, GOAL: 1,500 mg sodium/day maximum\par - Drink alcohol in moderation: 1 drink/day for women, 2 drinks/day for men \par *Follow the American Heart Association recommendations when you eat out, and monitor portion sizes\par

## 2022-09-19 NOTE — END OF VISIT
[FreeTextEntry3] : This note was written by Kiana Crawford on 09/19/2022, acting as a scribe for Dr. Donna MD.\par \par All medical record entries were at my, Dr. Shannon Thompson MD, direction and personally dictated by me in 09/19/2022. I have personally reviewed the chart and agree that the record accurately reflects my personal performance of the history, physical exam, assessment, and plan.\par

## 2022-09-19 NOTE — HISTORY OF PRESENT ILLNESS
[FreeTextEntry1] : Follow Up  [de-identified] : ISABELLA GAINES is a 41 year old female patient presenting to the clinic today for follow up. Mood is normal, appears well. Patient states she has changed eating habits and cut out certain foods but no progress with weight loss. She is disappointment that she has not loss any weight. Does not take weight at home often because it makes her anxious. \par \par Patient reports last month she had Covid which is the reason she had to reschedule appointment.

## 2022-09-19 NOTE — ASSESSMENT
[FreeTextEntry1] : ISABELLA GAINES is a 41 year old female patient presenting to the clinic today for follow up. \par \par # PE/Vitals\par - stable \par \par # Obesity\par - Discuss of diet and weight management

## 2022-09-23 LAB
25(OH)D3 SERPL-MCNC: 33.5 NG/ML
ALBUMIN SERPL ELPH-MCNC: 4.3 G/DL
ALP BLD-CCNC: 66 U/L
ALT SERPL-CCNC: 8 U/L
ANION GAP SERPL CALC-SCNC: 11 MMOL/L
AST SERPL-CCNC: 13 U/L
BASOPHILS # BLD AUTO: 0.04 K/UL
BASOPHILS NFR BLD AUTO: 0.7 %
BILIRUB SERPL-MCNC: 0.2 MG/DL
BUN SERPL-MCNC: 7 MG/DL
CALCIUM SERPL-MCNC: 9.6 MG/DL
CHLORIDE SERPL-SCNC: 107 MMOL/L
CHOLEST SERPL-MCNC: 172 MG/DL
CO2 SERPL-SCNC: 22 MMOL/L
CREAT SERPL-MCNC: 0.75 MG/DL
EGFR: 104 ML/MIN/1.73M2
EOSINOPHIL # BLD AUTO: 0.23 K/UL
EOSINOPHIL NFR BLD AUTO: 3.9 %
ESTIMATED AVERAGE GLUCOSE: 128 MG/DL
GLUCOSE SERPL-MCNC: 106 MG/DL
HBA1C MFR BLD HPLC: 6.1 %
HCT VFR BLD CALC: 40.2 %
HDLC SERPL-MCNC: 39 MG/DL
HGB BLD-MCNC: 12.4 G/DL
IMM GRANULOCYTES NFR BLD AUTO: 0.2 %
LDLC SERPL CALC-MCNC: 111 MG/DL
LYMPHOCYTES # BLD AUTO: 1.87 K/UL
LYMPHOCYTES NFR BLD AUTO: 32.1 %
MAN DIFF?: NORMAL
MCHC RBC-ENTMCNC: 23.6 PG
MCHC RBC-ENTMCNC: 30.8 GM/DL
MCV RBC AUTO: 76.6 FL
MONOCYTES # BLD AUTO: 0.42 K/UL
MONOCYTES NFR BLD AUTO: 7.2 %
NEUTROPHILS # BLD AUTO: 3.26 K/UL
NEUTROPHILS NFR BLD AUTO: 55.9 %
NONHDLC SERPL-MCNC: 133 MG/DL
PLATELET # BLD AUTO: 474 K/UL
POTASSIUM SERPL-SCNC: 4.5 MMOL/L
PROT SERPL-MCNC: 7.4 G/DL
RBC # BLD: 5.25 M/UL
RBC # FLD: 17 %
SODIUM SERPL-SCNC: 140 MMOL/L
TRIGL SERPL-MCNC: 112 MG/DL
TSH SERPL-ACNC: 0.64 UIU/ML
WBC # FLD AUTO: 5.83 K/UL

## 2023-05-15 ENCOUNTER — APPOINTMENT (OUTPATIENT)
Dept: FAMILY MEDICINE | Facility: CLINIC | Age: 43
End: 2023-05-15
Payer: MEDICAID

## 2023-05-15 VITALS
BODY MASS INDEX: 47.48 KG/M2 | OXYGEN SATURATION: 98 % | HEIGHT: 65 IN | SYSTOLIC BLOOD PRESSURE: 138 MMHG | HEART RATE: 96 BPM | WEIGHT: 285 LBS | DIASTOLIC BLOOD PRESSURE: 92 MMHG

## 2023-05-15 DIAGNOSIS — M25.512 PAIN IN LEFT SHOULDER: ICD-10-CM

## 2023-05-15 PROCEDURE — 99214 OFFICE O/P EST MOD 30 MIN: CPT

## 2023-05-16 NOTE — PLAN
[FreeTextEntry1] : # fasting blood work done in the office \par # referral to Pulmonologist \par # referral to ortho for left shoulder pain \par # advised to eat smaller portions \par # advised to download an shanita on food to monitor calorie count \par # advised to increase vegetable intake \par # will discuss further about weight loss injections once lab results is received \par # f/u next week or sooner if needed

## 2023-05-16 NOTE — HISTORY OF PRESENT ILLNESS
[FreeTextEntry1] : Follow Up [de-identified] : ISABELLA GAINES is a 42 year old female patient presenting to the clinic today for follow up. Mood is normal, appears well. Patient wants to discuss about taking Ozempic for weight loss. Has a concern when she should start getting mammogram as she just turn 42 yrs old. Additionally, patient wants to check to see if she has any vitamin deficiency. She takes a Multivitamin, Zinc, Tumeric, Vitamin B12, Iron, and other vitamins. She does not take them consistently. Notes she takes all the vitamin because she always tired.  When she took the vitamin the other day it made her nauseous and gave her an headache. Additionally, patient has never been examined for sleep apnea. She does snore at night. Also, needs a referral to ortho because of left shoulder pain.

## 2023-05-16 NOTE — END OF VISIT
[FreeTextEntry3] : This note was written by Kiana Crawford on 05/15/2023, acting as a scribe for Dr. Donna MD.\par \par All medical record entries were at my, Dr. Shannon Thompson MD, direction and personally dictated by me in 05/15/2023. I have personally reviewed the chart and agree that the record accurately reflects my personal performance of the history, physical exam, assessment, and plan.\par

## 2023-05-16 NOTE — ASSESSMENT
[FreeTextEntry1] : ISABELLA GAINES is a 42 year old female patient presenting to the clinic today for follow up. \par \par # PE/Vitals\par - stable\par \par # Elevated Blood Pressure\par - will continue to monitor \par \par # Pre-diabetes\par - A1c 6.1% from September 2022\par \par # Fatigue \par - needs a sleep study test to check for sleep apnea

## 2023-05-22 ENCOUNTER — APPOINTMENT (OUTPATIENT)
Dept: FAMILY MEDICINE | Facility: CLINIC | Age: 43
End: 2023-05-22
Payer: MEDICAID

## 2023-05-22 VITALS
HEIGHT: 65 IN | DIASTOLIC BLOOD PRESSURE: 88 MMHG | WEIGHT: 282 LBS | BODY MASS INDEX: 46.98 KG/M2 | HEART RATE: 92 BPM | SYSTOLIC BLOOD PRESSURE: 136 MMHG | OXYGEN SATURATION: 98 %

## 2023-05-22 PROCEDURE — 99214 OFFICE O/P EST MOD 30 MIN: CPT

## 2023-05-22 NOTE — END OF VISIT
[FreeTextEntry3] : This note was written by Dawna Mohr on 05/22/2023, acting as a scribe for MD Myriam.\par \par All medic record entries were at my, Dr.Meenu Donna MD, direction and personally dictated by me in 05/22/2023. I have personally reviewed the chart and agree that the record accurately reflects my personal performance of the history, physical exam, assessment, and plan.

## 2023-05-22 NOTE — ASSESSMENT
[FreeTextEntry1] : ISABELLA GAINES is a 42 year old female patient presenting to the office today for follow-up.\par \par #PE/Vitals\par - stable \par \par #Reviewed Lab Results \par - A1c 6.1\par - TSH stable \par - low Vitamin D\par \par #New Onset Type 2 DM\par - will start Ozempic to aide in weight loss and control A1c \par - discussed potential side effects of Ozempic\par \par #Discussed Diet and Lifestyle Changes for Weight Loss

## 2023-05-22 NOTE — HISTORY OF PRESENT ILLNESS
[FreeTextEntry1] : Follow-up [de-identified] : ISABELLA GAINES is a 42 year old female patient presenting to the office today for follow-up. Mood is good and appears well. No new complaints or changes in medical history. Patient reports she has started to reduce her portion sizes.

## 2023-05-22 NOTE — PLAN
[FreeTextEntry1] : # Start Rx Ozempic (0.25 MG/Dose) 2 MG/ 3 ML; inject 0.25 MG SQ weekly\par # advised to eat small portions \par # advised physical activity \par # advised low carbohydrate/sugar diet \par # f/u in one month or sooner if needed

## 2023-06-05 ENCOUNTER — APPOINTMENT (OUTPATIENT)
Dept: ORTHOPEDIC SURGERY | Facility: CLINIC | Age: 43
End: 2023-06-05
Payer: MEDICAID

## 2023-06-05 ENCOUNTER — NON-APPOINTMENT (OUTPATIENT)
Age: 43
End: 2023-06-05

## 2023-06-05 VITALS — HEIGHT: 65 IN | WEIGHT: 280 LBS | BODY MASS INDEX: 46.65 KG/M2

## 2023-06-05 PROCEDURE — 99215 OFFICE O/P EST HI 40 MIN: CPT

## 2023-06-05 PROCEDURE — 73030 X-RAY EXAM OF SHOULDER: CPT | Mod: LT

## 2023-06-05 NOTE — HISTORY OF PRESENT ILLNESS
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  She is been treating this with turmeric as well as intermittent Aleve use.  She localized the pain to the anterior shoulder.  It is nonradiating.  She denies paresthesias.  She feels it may be worse from her repetitive activities.  She was treated conservatively for rotator cuff tendinitis a few years ago with PT and anti-inflammatories.

## 2023-06-05 NOTE — PHYSICAL EXAM
[de-identified] : Physical Exam: \par General: Well appearing, no acute distress \par Neurologic: A&Ox3, No focal deficits \par Head: NCAT without abrasions, lacerations, or ecchymosis to head, face, or scalp \par Eyes: No scleral icterus, no gross abnormalities \par Respiratory: Equal chest wall expansion bilaterally, no accessory muscle use \par Lymphatic: No lymphadenopathy palpated \par Skin: Warm and dry Psychiatric: Normal mood and affect  \par C-Spine Palpation: No tenderness to paraspinal muscles and trapezius muscle \par ROM: side bending, symmetrical. \par Reflexes: C5-7 [normal]  \par \par Left (L) Shoulder: ·\par \par Inspection/Palpation: no tenderness, swelling or deformities · \par Range of Motion:  FF 0-160; ER at side 45; IR to L1 · \par Strength: forward elevation in scapular plane [5]/5, internal rotation [5]/5, external rotation [4]/5, adduction [5]/5 and abduction [5]/5 · \par Stability: load and shift test negative, apprehension test negative, relocation test negative, sulcus sign negative, Karen test negative, Jerk test negative · \par Tests: Moctezuma negative, Neer negative, drop arm test negative, bear hug test negative, Napolean sign negative, cross arm adduction negative, lift off sign negative, hornblowers sign negative, speeds test positive , Yergason's test negative, bicipital groove tenderness negative, Hummel's Active Compression test positive , whipple test negative, bicep's load II test negative  \par \par Right (R) Shoulder · \par \par Inspection/Palpation: no tenderness, swelling or deformities ·\par Range of Motion: full and painless in all planes, no crepitus; FF 0-170; ER at side 45; IR to T7 · \par Strength: forward elevation in scapular plane [5]/5, internal rotation [5]/5, external rotation [5]/5, adduction [5]/5 and abduction [5]/5 · \par Stability: load and shift test negative, apprehension test negative, relocation test negative, sulcus sign negative, Karen test negative, Jerk test negative · Tests: Moctezuma negative, Neer negative, drop arm test negative, bear hug test negative, Napolean sign negative, cross arm adduction negative, lift off sign negative, hornblowers sign negative, speeds test negative, Yergason's test negative, bicipital groove tenderness negative, Hummel's Active Compression test negative, whipple test negative, bicep's load II test negative  [de-identified] : The following radiographs were ordered and read by me during this patients visit. I reviewed each radiograph in detail with the patient and discussed the findings as highlighted below.   \par \par 4 views of the Left (L) shoulder show no fracture, dislocation or bony lesions. There is no evidence of degenerative change in the glenohumeral and acromioclavicular joints with maintenance of the joint space. Otherwise unremarkable.

## 2023-06-05 NOTE — DISCUSSION/SUMMARY
[de-identified] : We had a thorough discussion regarding the nature of her pain, the pathophysiology, as well as all treatment options. Based on clinical exam and radiograph findings she has bicipital tendinitis of the left shoulder. A prescription of Meloxicam was given to be taken as directed with food to prevent GI upset, if occurs pt to D/C and call us at that time. Patient was given prescription of formal physical therapy that she will perform 2x/wk for 6-8 wks. Patient will follow up in 6-8 wks for repeat clinical assessment. All questions were answered and the patient verbalized understanding. The patient is in agreement with this treatment plan.

## 2023-06-05 NOTE — ADDENDUM
[FreeTextEntry1] : Documented by Brandi Lezama acting as a scribe for Dr. Yan and Salvador Bravo PA-C on 06/05/2023.   All medical record entries made by the Scribe were at my, Dr. Yan, and Salvador Bravo's, direction and personally dictated by me on 06/05/2023. I have reviewed the chart and agree that the record accurately reflects my personal performance of the history, physical exam, procedure and imaging.

## 2023-06-26 ENCOUNTER — APPOINTMENT (OUTPATIENT)
Dept: FAMILY MEDICINE | Facility: CLINIC | Age: 43
End: 2023-06-26
Payer: MEDICAID

## 2023-06-26 VITALS
OXYGEN SATURATION: 98 % | WEIGHT: 278 LBS | HEIGHT: 65 IN | BODY MASS INDEX: 46.32 KG/M2 | SYSTOLIC BLOOD PRESSURE: 138 MMHG | DIASTOLIC BLOOD PRESSURE: 94 MMHG | HEART RATE: 83 BPM

## 2023-06-26 DIAGNOSIS — Z12.39 ENCOUNTER FOR OTHER SCREENING FOR MALIGNANT NEOPLASM OF BREAST: ICD-10-CM

## 2023-06-26 DIAGNOSIS — Z00.00 ENCOUNTER FOR GENERAL ADULT MEDICAL EXAMINATION W/OUT ABNORMAL FINDINGS: ICD-10-CM

## 2023-06-26 PROCEDURE — 99396 PREV VISIT EST AGE 40-64: CPT

## 2023-06-26 PROCEDURE — 99213 OFFICE O/P EST LOW 20 MIN: CPT | Mod: 25

## 2023-06-26 RX ORDER — BLOOD-GLUCOSE METER
W/DEVICE KIT MISCELLANEOUS
Qty: 1 | Refills: 0 | Status: ACTIVE | COMMUNITY
Start: 2023-06-26 | End: 1900-01-01

## 2023-06-26 RX ORDER — BLOOD-GLUCOSE METER
KIT MISCELLANEOUS DAILY
Qty: 1 | Refills: 2 | Status: ACTIVE | COMMUNITY
Start: 2023-06-26 | End: 1900-01-01

## 2023-06-26 RX ORDER — LANCETS 30 GAUGE
EACH MISCELLANEOUS
Qty: 200 | Refills: 0 | Status: ACTIVE | COMMUNITY
Start: 2023-06-26 | End: 1900-01-01

## 2023-06-26 RX ORDER — LISINOPRIL 5 MG/1
5 TABLET ORAL
Qty: 90 | Refills: 0 | Status: ACTIVE | COMMUNITY
Start: 2023-06-26 | End: 1900-01-01

## 2023-06-26 RX ORDER — ISOPROPYL ALCOHOL 70 ML/100ML
SWAB TOPICAL
Qty: 100 | Refills: 5 | Status: ACTIVE | COMMUNITY
Start: 2023-06-26 | End: 1900-01-01

## 2023-06-27 NOTE — ASSESSMENT
[FreeTextEntry1] : ISABELLA GAINES is a 42 year old female patient presenting to the office today for CPE.\par \par #PE/Vitals\par - elevated bp 138/94\par - lost 7 lbs since 5/15/23\par \par #PHQ-2 Behavioral Health Screenin\par #Reviewed Patients Medical History\par \par #Visual Acuity\par - Right Eye 20/20\par - Left Eye 20/20\par - Both Eyes 20/20\par \par #DM\par - started Ozempic 0.25 MG three weeks ago, no complications\par - will increase Ozempic td to further assist with weight loss\par \par #HTN\par - discussed diet and lifestyle changes to reduce BP\par - discussed Lisinopril and potential side effects; highly recommend for people with DM

## 2023-06-27 NOTE — HISTORY OF PRESENT ILLNESS
[FreeTextEntry1] : CPE [de-identified] : ISABELLA GAINES is a 42 year old female patient presenting to the office today for CPE. Mood is good and appears well. Patient started Ozempic 0.25 MG three weeks ago, denies complications. She has started to adjust diet to low carbohydrate/sugar. \par \par Patient was recently prescribed Meloxicam from orthopedist for shoulder pain. She has only taken one tab as she only uses as needed.

## 2023-06-27 NOTE — END OF VISIT
[FreeTextEntry3] : This note was written by Dawna Mohr on 06/26/2023, acting as a scribe for MD Myriam.\par \par All medic record entries were at my, Dr.Meenu Donna MD, direction and personally dictated by me in 06/26/2023. I have personally reviewed the chart and agree that the record accurately reflects my personal performance of the history, physical exam, assessment, and plan.

## 2023-06-27 NOTE — HEALTH RISK ASSESSMENT
[Good] : ~his/her~  mood as  good [No] : No [No falls in past year] : Patient reported no falls in the past year [Never] : Never [PHQ-2 Negative - No further assessment needed] : PHQ-2 Negative - No further assessment needed [PHQ-9 Negative - No further assessment needed] : PHQ-9 Negative - No further assessment needed [[id number]] : 0

## 2023-06-27 NOTE — PLAN
[FreeTextEntry1] : # Increase Rx Ozempic from 0.25 MG to 0.5 MG/dose\par # Start Rx Lisinopril 5 MG\par # Start alcohol swabs\par # Start Blood Glucose Monitor System w/ Device kit\par # Start Blood glucose tst in vitro strips\par # Start Lancets ultra thin 30G\par # order mammogram\par # advised to measure weight at home every morning\par # advised to increase water intake\par # advised low salt diet \par # f/u in one month or sooner if needed

## 2023-07-06 ENCOUNTER — RX RENEWAL (OUTPATIENT)
Age: 43
End: 2023-07-06

## 2023-07-06 RX ORDER — MELOXICAM 15 MG/1
15 TABLET ORAL
Qty: 21 | Refills: 0 | Status: ACTIVE | COMMUNITY
Start: 2023-06-14 | End: 1900-01-01

## 2023-07-11 ENCOUNTER — APPOINTMENT (OUTPATIENT)
Dept: PULMONOLOGY | Facility: CLINIC | Age: 43
End: 2023-07-11
Payer: MEDICAID

## 2023-07-11 VITALS
HEART RATE: 76 BPM | SYSTOLIC BLOOD PRESSURE: 118 MMHG | WEIGHT: 273 LBS | OXYGEN SATURATION: 98 % | DIASTOLIC BLOOD PRESSURE: 74 MMHG | BODY MASS INDEX: 45.43 KG/M2

## 2023-07-11 DIAGNOSIS — G47.33 OBSTRUCTIVE SLEEP APNEA (ADULT) (PEDIATRIC): ICD-10-CM

## 2023-07-11 PROCEDURE — 99203 OFFICE O/P NEW LOW 30 MIN: CPT

## 2023-07-11 NOTE — CONSULT LETTER
[Dear  ___] : Dear  [unfilled], [Consult Letter:] : I had the pleasure of evaluating your patient, [unfilled]. [Please see my note below.] : Please see my note below. [Consult Closing:] : Thank you very much for allowing me to participate in the care of this patient.  If you have any questions, please do not hesitate to contact me. [Sincerely,] : Sincerely, [DrTra  ___] : Dr. SILVESTRE

## 2023-07-13 ENCOUNTER — APPOINTMENT (OUTPATIENT)
Dept: DERMATOLOGY | Facility: CLINIC | Age: 43
End: 2023-07-13
Payer: MEDICAID

## 2023-07-13 PROCEDURE — 99204 OFFICE O/P NEW MOD 45 MIN: CPT

## 2023-07-20 ENCOUNTER — OUTPATIENT (OUTPATIENT)
Dept: OUTPATIENT SERVICES | Facility: HOSPITAL | Age: 43
LOS: 1 days | End: 2023-07-20
Payer: COMMERCIAL

## 2023-07-20 DIAGNOSIS — G47.33 OBSTRUCTIVE SLEEP APNEA (ADULT) (PEDIATRIC): ICD-10-CM

## 2023-07-20 PROCEDURE — 95800 SLP STDY UNATTENDED: CPT | Mod: 26

## 2023-07-20 PROCEDURE — 95800 SLP STDY UNATTENDED: CPT

## 2023-07-27 ENCOUNTER — APPOINTMENT (OUTPATIENT)
Dept: ORTHOPEDIC SURGERY | Facility: CLINIC | Age: 43
End: 2023-07-27
Payer: MEDICAID

## 2023-07-27 VITALS — WEIGHT: 273 LBS | HEIGHT: 68 IN | BODY MASS INDEX: 41.37 KG/M2

## 2023-07-27 DIAGNOSIS — M75.22 BICIPITAL TENDINITIS, LEFT SHOULDER: ICD-10-CM

## 2023-07-27 PROCEDURE — 99213 OFFICE O/P EST LOW 20 MIN: CPT

## 2023-07-30 NOTE — DISCUSSION/SUMMARY
[de-identified] : I had a discussion with the patient regarding her condition.  At this time she is progressing well.  I recommend she continue few more sessions of physical therapy and transition to home program.  We discussed meloxicam as well as potential cortisone injection.  We will revisit that in the future if she has persistent pain.  She is doing very well and she will continue with her current care.  She will follow-up with us as needed.

## 2023-07-30 NOTE — HISTORY OF PRESENT ILLNESS
[de-identified] : Patient is a 42-year-old female here today for follow-up evaluation of her left proximal biceps tendinitis.  Patient states she has been going to physical therapy in [place].  She has noted significant improvement of her pain.  She feels about 90% improved.  She still notes some anterior shoulder pain that occasionally is in the lateral aspect of her pack as well.  She denies paresthesias.  She takes meloxicam intermittently.

## 2023-07-30 NOTE — PHYSICAL EXAM
[de-identified] : Physical Exam: \par General: Well appearing, no acute distress \par Neurologic: A&Ox3, No focal deficits \par Head: NCAT without abrasions, lacerations, or ecchymosis to head, face, or scalp \par Eyes: No scleral icterus, no gross abnormalities \par Respiratory: Equal chest wall expansion bilaterally, no accessory muscle use \par Lymphatic: No lymphadenopathy palpated \par Skin: Warm and dry \par Psychiatric: Normal mood and affect\par \par Left shoulder is examined and reveals no swelling or deformity.  The patient has minimal tenderness over the proximal bicep as well as mildly over the pec insertion.  She has active forward flexion to 170 degrees external rotation 80 degrees internal rotation to a upper lumbar level.  She has negative Moctezuma and Neer's test.  Negative Yergason test.  She has 5 out of 5 strength rotator cuff testing.  Her compartments are soft and nontender.  She is grossly neurovascular intact distally.

## 2023-07-31 ENCOUNTER — APPOINTMENT (OUTPATIENT)
Dept: FAMILY MEDICINE | Facility: CLINIC | Age: 43
End: 2023-07-31

## 2023-08-17 ENCOUNTER — NON-APPOINTMENT (OUTPATIENT)
Age: 43
End: 2023-08-17

## 2023-08-18 NOTE — HISTORY OF PRESENT ILLNESS
[Obstructive Sleep Apnea] : obstructive sleep apnea [Awakes Unrefreshed] : awakes unrefreshed [Awakes with Dry Mouth] : awakes with dry mouth [Awakes with Headache] : awakes with headache [Daytime Somnolence] : daytime somnolence [Nonrestorative Sleep] : nonrestorative sleep [Recent  Weight Gain] : recent weight gain [TextBox_4] : 42 y.o obese male hypertensive evaluated for RYLIE  [ESS] : 9

## 2023-08-18 NOTE — DISCUSSION/SUMMARY
[FreeTextEntry1] : Assess\par \par Obesity \par RYLIE\par \par Plan\par \par Weight loss\par Sleep test\par 6 week FU

## 2023-08-22 ENCOUNTER — APPOINTMENT (OUTPATIENT)
Dept: PULMONOLOGY | Facility: CLINIC | Age: 43
End: 2023-08-22
Payer: MEDICAID

## 2023-08-22 VITALS
HEIGHT: 65 IN | BODY MASS INDEX: 45.48 KG/M2 | RESPIRATION RATE: 16 BRPM | HEART RATE: 79 BPM | SYSTOLIC BLOOD PRESSURE: 120 MMHG | DIASTOLIC BLOOD PRESSURE: 72 MMHG | WEIGHT: 273 LBS | OXYGEN SATURATION: 98 %

## 2023-08-22 VITALS — WEIGHT: 265 LBS | BODY MASS INDEX: 44.1 KG/M2

## 2023-08-22 DIAGNOSIS — R06.83 SNORING: ICD-10-CM

## 2023-08-22 PROCEDURE — 99213 OFFICE O/P EST LOW 20 MIN: CPT

## 2023-08-22 RX ORDER — PREDNISONE 50 MG/1
TABLET ORAL
Refills: 0 | Status: DISCONTINUED | COMMUNITY
End: 2023-08-22

## 2023-08-24 NOTE — PLAN
There are no Wet Read(s) to document. There is 1 Wet Read(s) to document. [FreeTextEntry1] : Urinary frequency: urine dip was negative and showed small traces of blood/ she just got off her menses\par BP elevated today-she has declined BP medication and understand the risks / advise low salt diet / Home BP monitoring\par Labs drawn in office today.\par Diet and Activity: - Choose lean meats and poultry without skin and prepare them without added saturated and trans \par fat. Eat fish at least,twice a week. Recent research shows that eating oily \par fish containing omega-3,fatty acids (for example, salmon, trout and herring) \par may help lower your risk of death from coronary artery disease. Select \par fat-free, 1 percent fat and low-fat dairy products. Cut back on foods \par containing partially hydrogenated vegetable oils to reduce trans fat in your \par diet. To lower cholesterol, reduce saturated fat to no more than 5 to 6 \par percent of total calories. For someone eating 2,000 calories a day, that’s \par about 13 grams of saturated fat. Cut back on beverages and foods with added \par sugars. Choose and prepare foods with little or no salt. To lower blood \par pressure, aim to eat no more than 2,400 milligrams of sodium per day. \par Reducing daily intake to 1,500 mg is desirable because it can lower blood \par pressure even further. If you drink alcohol, drink in moderation. That means \par one drink per day if youre a woman and two drinks per day if youre a \par man Follow the American Heart Association recommendations when you eat out, \par and keep an eye on your portion sizes.\par F.U 2 WEEKS

## 2023-09-25 ENCOUNTER — APPOINTMENT (OUTPATIENT)
Dept: FAMILY MEDICINE | Facility: CLINIC | Age: 43
End: 2023-09-25
Payer: MEDICAID

## 2023-09-25 VITALS
DIASTOLIC BLOOD PRESSURE: 92 MMHG | SYSTOLIC BLOOD PRESSURE: 140 MMHG | WEIGHT: 260 LBS | BODY MASS INDEX: 43.32 KG/M2 | HEIGHT: 65 IN | HEART RATE: 96 BPM | OXYGEN SATURATION: 98 %

## 2023-09-25 PROCEDURE — 99214 OFFICE O/P EST MOD 30 MIN: CPT

## 2023-09-26 LAB
ALBUMIN SERPL ELPH-MCNC: 4.3 G/DL
ALP BLD-CCNC: 57 U/L
ALT SERPL-CCNC: 8 U/L
ANION GAP SERPL CALC-SCNC: 13 MMOL/L
AST SERPL-CCNC: 15 U/L
BILIRUB SERPL-MCNC: 0.3 MG/DL
BUN SERPL-MCNC: 8 MG/DL
CALCIUM SERPL-MCNC: 9.6 MG/DL
CHLORIDE SERPL-SCNC: 107 MMOL/L
CO2 SERPL-SCNC: 21 MMOL/L
CREAT SERPL-MCNC: 0.8 MG/DL
EGFR: 95 ML/MIN/1.73M2
GLUCOSE SERPL-MCNC: 91 MG/DL
POTASSIUM SERPL-SCNC: 4.2 MMOL/L
PROT SERPL-MCNC: 7.2 G/DL
SODIUM SERPL-SCNC: 141 MMOL/L

## 2023-10-30 ENCOUNTER — APPOINTMENT (OUTPATIENT)
Dept: FAMILY MEDICINE | Facility: CLINIC | Age: 43
End: 2023-10-30
Payer: MEDICAID

## 2023-10-30 ENCOUNTER — LABORATORY RESULT (OUTPATIENT)
Age: 43
End: 2023-10-30

## 2023-10-30 VITALS
BODY MASS INDEX: 59.02 KG/M2 | DIASTOLIC BLOOD PRESSURE: 88 MMHG | SYSTOLIC BLOOD PRESSURE: 140 MMHG | WEIGHT: 255 LBS | OXYGEN SATURATION: 98 % | HEART RATE: 92 BPM | HEIGHT: 55 IN

## 2023-10-30 DIAGNOSIS — I10 ESSENTIAL (PRIMARY) HYPERTENSION: ICD-10-CM

## 2023-10-30 PROCEDURE — 99214 OFFICE O/P EST MOD 30 MIN: CPT

## 2023-10-31 LAB
APPEARANCE: CLEAR
BILIRUBIN URINE: NEGATIVE
BLOOD URINE: ABNORMAL
COLOR: YELLOW
ESTIMATED AVERAGE GLUCOSE: 120 MG/DL
GLUCOSE QUALITATIVE U: NEGATIVE MG/DL
HBA1C MFR BLD HPLC: 5.8 %
KETONES URINE: NEGATIVE MG/DL
LEUKOCYTE ESTERASE URINE: NEGATIVE
NITRITE URINE: NEGATIVE
PH URINE: 5.5
PROTEIN URINE: NORMAL MG/DL
SPECIFIC GRAVITY URINE: 1.02
UROBILINOGEN URINE: 0.2 MG/DL

## 2023-11-01 LAB
BASOPHILS # BLD AUTO: 0.03 K/UL
BASOPHILS NFR BLD AUTO: 0.6 %
EOSINOPHIL # BLD AUTO: 0.24 K/UL
EOSINOPHIL NFR BLD AUTO: 4.4 %
HCT VFR BLD CALC: 35.8 %
HGB BLD-MCNC: 10.6 G/DL
IMM GRANULOCYTES NFR BLD AUTO: 0.2 %
LYMPHOCYTES # BLD AUTO: 1.42 K/UL
LYMPHOCYTES NFR BLD AUTO: 26.1 %
MAN DIFF?: NORMAL
MCHC RBC-ENTMCNC: 21.5 PG
MCHC RBC-ENTMCNC: 29.6 GM/DL
MCV RBC AUTO: 72.5 FL
MONOCYTES # BLD AUTO: 0.35 K/UL
MONOCYTES NFR BLD AUTO: 6.4 %
NEUTROPHILS # BLD AUTO: 3.4 K/UL
NEUTROPHILS NFR BLD AUTO: 62.3 %
PLATELET # BLD AUTO: 488 K/UL
RBC # BLD: 4.94 M/UL
RBC # FLD: 17.2 %
WBC # FLD AUTO: 5.45 K/UL

## 2023-11-08 LAB
ALBUMIN SERPL ELPH-MCNC: 4.1 G/DL
ALP BLD-CCNC: 61 U/L
ALT SERPL-CCNC: 10 U/L
ANION GAP SERPL CALC-SCNC: 10 MMOL/L
AST SERPL-CCNC: 10 U/L
BILIRUB SERPL-MCNC: 0.2 MG/DL
BUN SERPL-MCNC: 7 MG/DL
CALCIUM SERPL-MCNC: 9.6 MG/DL
CHLORIDE SERPL-SCNC: 107 MMOL/L
CHOLEST SERPL-MCNC: 163 MG/DL
CO2 SERPL-SCNC: 22 MMOL/L
CREAT SERPL-MCNC: 0.78 MG/DL
EGFR: 98 ML/MIN/1.73M2
FOLATE SERPL-MCNC: 7.7 NG/ML
GLUCOSE SERPL-MCNC: 102 MG/DL
HDLC SERPL-MCNC: 37 MG/DL
LDLC SERPL CALC-MCNC: 105 MG/DL
NONHDLC SERPL-MCNC: 126 MG/DL
POTASSIUM SERPL-SCNC: 4 MMOL/L
PROT SERPL-MCNC: 7.2 G/DL
SODIUM SERPL-SCNC: 139 MMOL/L
TRIGL SERPL-MCNC: 115 MG/DL
TSH SERPL-ACNC: 0.43 UIU/ML
VIT B12 SERPL-MCNC: 478 PG/ML

## 2024-01-29 ENCOUNTER — APPOINTMENT (OUTPATIENT)
Dept: FAMILY MEDICINE | Facility: CLINIC | Age: 44
End: 2024-01-29
Payer: MEDICAID

## 2024-01-29 VITALS
WEIGHT: 248 LBS | HEIGHT: 65 IN | SYSTOLIC BLOOD PRESSURE: 122 MMHG | BODY MASS INDEX: 41.32 KG/M2 | HEART RATE: 81 BPM | DIASTOLIC BLOOD PRESSURE: 86 MMHG | TEMPERATURE: 98.1 F | OXYGEN SATURATION: 96 %

## 2024-01-29 DIAGNOSIS — D50.8 OTHER IRON DEFICIENCY ANEMIAS: ICD-10-CM

## 2024-01-29 DIAGNOSIS — B37.0 CANDIDAL STOMATITIS: ICD-10-CM

## 2024-01-29 DIAGNOSIS — E11.9 TYPE 2 DIABETES MELLITUS W/OUT COMPLICATIONS: ICD-10-CM

## 2024-01-29 PROCEDURE — G2211 COMPLEX E/M VISIT ADD ON: CPT

## 2024-01-29 PROCEDURE — 99214 OFFICE O/P EST MOD 30 MIN: CPT

## 2024-01-29 RX ORDER — NYSTATIN 100000 [USP'U]/ML
100000 SUSPENSION ORAL 3 TIMES DAILY
Qty: 100 | Refills: 1 | Status: ACTIVE | COMMUNITY
Start: 2024-01-29 | End: 1900-01-01

## 2024-01-29 NOTE — HEALTH RISK ASSESSMENT
[Good] : ~his/her~  mood as  good [No] : No [No falls in past year] : Patient reported no falls in the past year [PHQ-2 Negative - No further assessment needed] : PHQ-2 Negative - No further assessment needed [PHQ-9 Negative - No further assessment needed] : PHQ-9 Negative - No further assessment needed [Never] : Never [[id number]] : 0

## 2024-01-29 NOTE — HISTORY OF PRESENT ILLNESS
[FreeTextEntry1] : follow up weight loss/tongue bleeding/HTN [de-identified] : Today patient is here for follow-up she is feels that she is not losing weight on Mounjaro 5 mg as fast she was losing weight for and also she is more hungry. Patient also states that she finds her tongue to be little more sore and it is bleeding when she is cleaning her tongue.  Patient recently had viral infection and she recovered from it but she does not know what exactly she had. She is taking her blood pressure medication and maintaining her weight.  10/2023-patient is here for follow-up she has been taking Ozempic and feeling great on it She has lost weight she is currently on 1 mg.  Wants to get recent lab to see if she has made any improvement in her blood work. She needs a new prescription for Ozempic  old note-ISABELLA GAINES is a 42 year old female patient presenting to the office today for CPE. Mood is good and appears well. Patient started Ozempic 0.25 MG three weeks ago, denies complications. She has started to adjust diet to low carbohydrate/sugar.   Patient was recently prescribed Meloxicam from orthopedist for shoulder pain. She has only taken one tab as she only uses as needed. 09/2023- here for follow up  has not started ace inhibitor wants dose increased as she gets little more hungary FS are shows 110 she has lost 20 lbs

## 2024-01-29 NOTE — PHYSICAL EXAM
[No Acute Distress] : no acute distress [Well Nourished] : well nourished [Well Developed] : well developed [Well-Appearing] : well-appearing [Normal Sclera/Conjunctiva] : normal sclera/conjunctiva [PERRL] : pupils equal round and reactive to light [EOMI] : extraocular movements intact [Normal Outer Ear/Nose] : the outer ears and nose were normal in appearance [Normal Oropharynx] : the oropharynx was normal [No JVD] : no jugular venous distention [No Lymphadenopathy] : no lymphadenopathy [Supple] : supple [Thyroid Normal, No Nodules] : the thyroid was normal and there were no nodules present [No Respiratory Distress] : no respiratory distress  [No Accessory Muscle Use] : no accessory muscle use [Clear to Auscultation] : lungs were clear to auscultation bilaterally [Normal Rate] : normal rate  [Regular Rhythm] : with a regular rhythm [Normal S1, S2] : normal S1 and S2 [No Murmur] : no murmur heard [No Carotid Bruits] : no carotid bruits [No Abdominal Bruit] : a ~M bruit was not heard ~T in the abdomen [No Varicosities] : no varicosities [Pedal Pulses Present] : the pedal pulses are present [No Edema] : there was no peripheral edema [No Palpable Aorta] : no palpable aorta [No Extremity Clubbing/Cyanosis] : no extremity clubbing/cyanosis [Soft] : abdomen soft [Non Tender] : non-tender [Non-distended] : non-distended [No Masses] : no abdominal mass palpated [No HSM] : no HSM [Normal Bowel Sounds] : normal bowel sounds [Normal Posterior Cervical Nodes] : no posterior cervical lymphadenopathy [Normal Anterior Cervical Nodes] : no anterior cervical lymphadenopathy [No CVA Tenderness] : no CVA  tenderness [No Spinal Tenderness] : no spinal tenderness [No Joint Swelling] : no joint swelling [Grossly Normal Strength/Tone] : grossly normal strength/tone [No Rash] : no rash [Coordination Grossly Intact] : coordination grossly intact [No Focal Deficits] : no focal deficits [Normal Gait] : normal gait [Deep Tendon Reflexes (DTR)] : deep tendon reflexes were 2+ and symmetric [Normal Affect] : the affect was normal [Normal Insight/Judgement] : insight and judgment were intact [de-identified] : Oral candidiasis

## 2024-01-29 NOTE — PLAN
[FreeTextEntry1] : # Increase Mounjaro to 7.5 mg have lost weight 7 pounds in last 3 months Hypertension better controlled Seems to be oral candidiasis and advised patient to use nystatin as directed sent doses for 3 months hypoglycemia discussed    # Start Rx Lisinopril 5 MG- advice to start againa s she is not taking it  # Start alcohol swabs # Start Blood Glucose Monitor System w/ Device kit # Start Blood glucose tst in vitro strips # Start Lancets ultra thin 30G # order mammogram # advised to measure weight at home every morning # advised to increase water intake # advised low salt diet  # f/u in one month or sooner if needed

## 2024-01-30 LAB
ALBUMIN SERPL ELPH-MCNC: 4.1 G/DL
ALP BLD-CCNC: 61 U/L
ALT SERPL-CCNC: 9 U/L
ANION GAP SERPL CALC-SCNC: 12 MMOL/L
APPEARANCE: CLEAR
AST SERPL-CCNC: 12 U/L
BASOPHILS # BLD AUTO: 0.04 K/UL
BASOPHILS NFR BLD AUTO: 0.7 %
BILIRUB SERPL-MCNC: <0.2 MG/DL
BILIRUBIN URINE: NEGATIVE
BLOOD URINE: NEGATIVE
BUN SERPL-MCNC: 12 MG/DL
CALCIUM SERPL-MCNC: 9.3 MG/DL
CHLORIDE SERPL-SCNC: 107 MMOL/L
CHOLEST SERPL-MCNC: 196 MG/DL
CO2 SERPL-SCNC: 20 MMOL/L
COLOR: YELLOW
CREAT SERPL-MCNC: 0.77 MG/DL
EGFR: 99 ML/MIN/1.73M2
EOSINOPHIL # BLD AUTO: 0.23 K/UL
EOSINOPHIL NFR BLD AUTO: 4.2 %
ESTIMATED AVERAGE GLUCOSE: 111 MG/DL
FERRITIN SERPL-MCNC: 10 NG/ML
GLUCOSE QUALITATIVE U: NEGATIVE MG/DL
GLUCOSE SERPL-MCNC: 95 MG/DL
HBA1C MFR BLD HPLC: 5.5 %
HCT VFR BLD CALC: 34.7 %
HDLC SERPL-MCNC: 41 MG/DL
HGB BLD-MCNC: 10.7 G/DL
IMM GRANULOCYTES NFR BLD AUTO: 0.2 %
IRON SATN MFR SERPL: 6 %
IRON SERPL-MCNC: 21 UG/DL
KETONES URINE: NEGATIVE MG/DL
LDLC SERPL CALC-MCNC: 128 MG/DL
LEUKOCYTE ESTERASE URINE: NEGATIVE
LYMPHOCYTES # BLD AUTO: 1.49 K/UL
LYMPHOCYTES NFR BLD AUTO: 27.4 %
MAN DIFF?: NORMAL
MCHC RBC-ENTMCNC: 22.8 PG
MCHC RBC-ENTMCNC: 30.8 GM/DL
MCV RBC AUTO: 73.8 FL
MONOCYTES # BLD AUTO: 0.39 K/UL
MONOCYTES NFR BLD AUTO: 7.2 %
NEUTROPHILS # BLD AUTO: 3.28 K/UL
NEUTROPHILS NFR BLD AUTO: 60.3 %
NITRITE URINE: NEGATIVE
NONHDLC SERPL-MCNC: 155 MG/DL
PH URINE: 5.5
PLATELET # BLD AUTO: 509 K/UL
POTASSIUM SERPL-SCNC: 4.4 MMOL/L
PROT SERPL-MCNC: 7 G/DL
PROTEIN URINE: NEGATIVE MG/DL
RBC # BLD: 4.7 M/UL
RBC # FLD: 17.4 %
SODIUM SERPL-SCNC: 139 MMOL/L
SPECIFIC GRAVITY URINE: 1.02
TIBC SERPL-MCNC: 374 UG/DL
TRIGL SERPL-MCNC: 153 MG/DL
TSH SERPL-ACNC: 0.37 UIU/ML
UIBC SERPL-MCNC: 353 UG/DL
UROBILINOGEN URINE: 0.2 MG/DL
VIT B12 SERPL-MCNC: 399 PG/ML
WBC # FLD AUTO: 5.44 K/UL

## 2024-01-31 LAB — FOLATE SERPL-MCNC: 4 NG/ML

## 2024-02-01 RX ORDER — SEMAGLUTIDE 0.68 MG/ML
2 INJECTION, SOLUTION SUBCUTANEOUS
Qty: 12 | Refills: 0 | Status: ACTIVE | COMMUNITY
Start: 2023-05-22

## 2024-03-11 ENCOUNTER — APPOINTMENT (OUTPATIENT)
Dept: FAMILY MEDICINE | Facility: CLINIC | Age: 44
End: 2024-03-11
Payer: MEDICAID

## 2024-03-11 VITALS
OXYGEN SATURATION: 95 % | DIASTOLIC BLOOD PRESSURE: 78 MMHG | WEIGHT: 245 LBS | BODY MASS INDEX: 40.82 KG/M2 | SYSTOLIC BLOOD PRESSURE: 118 MMHG | HEART RATE: 78 BPM | HEIGHT: 65 IN

## 2024-03-11 DIAGNOSIS — R79.89 OTHER SPECIFIED ABNORMAL FINDINGS OF BLOOD CHEMISTRY: ICD-10-CM

## 2024-03-11 DIAGNOSIS — E78.5 HYPERLIPIDEMIA, UNSPECIFIED: ICD-10-CM

## 2024-03-11 DIAGNOSIS — E66.01 MORBID (SEVERE) OBESITY DUE TO EXCESS CALORIES: ICD-10-CM

## 2024-03-11 LAB — HBA1C MFR BLD HPLC: 6.1

## 2024-03-11 PROCEDURE — 99214 OFFICE O/P EST MOD 30 MIN: CPT

## 2024-03-11 NOTE — HISTORY OF PRESENT ILLNESS
[FreeTextEntry1] : follow up weight loss/ [de-identified] : 3/24- feels tired lost a little weight this time, doesnot feel she has much energy sleep apnea test was negative   old note-Today patient is here for follow-up she is feels that she is not losing weight on Mounjaro 5 mg as fast she was losing weight for and also she is more hungry. Patient also states that she finds her tongue to be little more sore and it is bleeding when she is cleaning her tongue.  Patient recently had viral infection and she recovered from it but she does not know what exactly she had. She is taking her blood pressure medication and maintaining her weight.  10/2023-patient is here for follow-up she has been taking Ozempic and feeling great on it She has lost weight she is currently on 1 mg.  Wants to get recent lab to see if she has made any improvement in her blood work. She needs a new prescription for Ozempic  old note-ISABELLA GAINES is a 42 year old female patient presenting to the office today for CPE. Mood is good and appears well. Patient started Ozempic 0.25 MG three weeks ago, denies complications. She has started to adjust diet to low carbohydrate/sugar.   Patient was recently prescribed Meloxicam from orthopedist for shoulder pain. She has only taken one tab as she only uses as needed. 09/2023- here for follow up  has not started ace inhibitor wants dose increased as she gets little more hungary FS are shows 110 she has lost 20 lbs

## 2024-03-11 NOTE — PHYSICAL EXAM
[No Acute Distress] : no acute distress [Well Nourished] : well nourished [Well Developed] : well developed [Well-Appearing] : well-appearing [PERRL] : pupils equal round and reactive to light [Normal Sclera/Conjunctiva] : normal sclera/conjunctiva [Normal Outer Ear/Nose] : the outer ears and nose were normal in appearance [EOMI] : extraocular movements intact [Normal Oropharynx] : the oropharynx was normal [No JVD] : no jugular venous distention [Supple] : supple [No Lymphadenopathy] : no lymphadenopathy [Thyroid Normal, No Nodules] : the thyroid was normal and there were no nodules present [No Respiratory Distress] : no respiratory distress  [No Accessory Muscle Use] : no accessory muscle use [Clear to Auscultation] : lungs were clear to auscultation bilaterally [Regular Rhythm] : with a regular rhythm [Normal Rate] : normal rate  [Normal S1, S2] : normal S1 and S2 [No Murmur] : no murmur heard [No Abdominal Bruit] : a ~M bruit was not heard ~T in the abdomen [No Carotid Bruits] : no carotid bruits [Pedal Pulses Present] : the pedal pulses are present [No Varicosities] : no varicosities [No Edema] : there was no peripheral edema [No Palpable Aorta] : no palpable aorta [No Extremity Clubbing/Cyanosis] : no extremity clubbing/cyanosis [Non Tender] : non-tender [Soft] : abdomen soft [Non-distended] : non-distended [No Masses] : no abdominal mass palpated [No HSM] : no HSM [Normal Posterior Cervical Nodes] : no posterior cervical lymphadenopathy [Normal Bowel Sounds] : normal bowel sounds [Normal Anterior Cervical Nodes] : no anterior cervical lymphadenopathy [No CVA Tenderness] : no CVA  tenderness [No Spinal Tenderness] : no spinal tenderness [No Joint Swelling] : no joint swelling [Grossly Normal Strength/Tone] : grossly normal strength/tone [No Rash] : no rash [Coordination Grossly Intact] : coordination grossly intact [No Focal Deficits] : no focal deficits [Normal Gait] : normal gait [Deep Tendon Reflexes (DTR)] : deep tendon reflexes were 2+ and symmetric [Normal Affect] : the affect was normal [Normal Insight/Judgement] : insight and judgment were intact [de-identified] : Oral candidiasis

## 2024-03-11 NOTE — HEALTH RISK ASSESSMENT
[No] : No [Good] : ~his/her~  mood as  good [No falls in past year] : Patient reported no falls in the past year [PHQ-2 Negative - No further assessment needed] : PHQ-2 Negative - No further assessment needed [PHQ-9 Negative - No further assessment needed] : PHQ-9 Negative - No further assessment needed [Never] : Never [[id number]] : 0

## 2024-03-11 NOTE — PLAN
[FreeTextEntry1] : # continue Mounjaro to 7.5 mg have lost weight 3 pounds in last in 6 week may need increase to 10 mg anemia- advice to increase iron intake, addchewable b12 twiceperweek  Hypertension better controlled Seems to be oral candidiasis and advised patient to use nystatin as directed sent doses for 3 months hypoglycemia discussed    # Start Rx Lisinopril 5 MG- advice to start againa s she is not taking it  # Start alcohol swabs # Start Blood Glucose Monitor System w/ Device kit # Start Blood glucose tst in vitro strips # Start Lancets ultra thin 30G # order mammogram # advised to measure weight at home every morning # advised to increase water intake # advised low salt diet  # f/u in one month or sooner if needed

## 2024-04-22 ENCOUNTER — APPOINTMENT (OUTPATIENT)
Dept: FAMILY MEDICINE | Facility: CLINIC | Age: 44
End: 2024-04-22
Payer: MEDICAID

## 2024-04-22 VITALS
BODY MASS INDEX: 41.48 KG/M2 | DIASTOLIC BLOOD PRESSURE: 68 MMHG | SYSTOLIC BLOOD PRESSURE: 132 MMHG | HEART RATE: 78 BPM | WEIGHT: 249 LBS | OXYGEN SATURATION: 97 % | HEIGHT: 65 IN

## 2024-04-22 DIAGNOSIS — R53.83 OTHER FATIGUE: ICD-10-CM

## 2024-04-22 PROCEDURE — 99213 OFFICE O/P EST LOW 20 MIN: CPT

## 2024-04-22 RX ORDER — TIRZEPATIDE 10 MG/.5ML
10 INJECTION, SOLUTION SUBCUTANEOUS
Qty: 3 | Refills: 2 | Status: ACTIVE | COMMUNITY
Start: 2023-09-25 | End: 1900-01-01

## 2024-04-22 NOTE — HISTORY OF PRESENT ILLNESS
[FreeTextEntry1] : follow up weight loss/ [de-identified] : 3/24- feels tired lost a little weight this time, doesnot feel she has much energy sleep apnea test was negative   old note-Today patient is here for follow-up she is feels that she is not losing weight on Mounjaro 5 mg as fast she was losing weight for and also she is more hungry. Patient also states that she finds her tongue to be little more sore and it is bleeding when she is cleaning her tongue.  Patient recently had viral infection and she recovered from it but she does not know what exactly she had. She is taking her blood pressure medication and maintaining her weight.  10/2023-patient is here for follow-up she has been taking Ozempic and feeling great on it She has lost weight she is currently on 1 mg.  Wants to get recent lab to see if she has made any improvement in her blood work. She needs a new prescription for Ozempic  old note-ISABELLA GAINES is a 42 year old female patient presenting to the office today for CPE. Mood is good and appears well. Patient started Ozempic 0.25 MG three weeks ago, denies complications. She has started to adjust diet to low carbohydrate/sugar.   Patient was recently prescribed Meloxicam from orthopedist for shoulder pain. She has only taken one tab as she only uses as needed. 09/2023- here for follow up  has not started ace inhibitor wants dose increased as she gets little more hungary FS are shows 110 she has lost 20 lbs

## 2024-04-22 NOTE — PHYSICAL EXAM
[No Acute Distress] : no acute distress [Well Nourished] : well nourished [Well Developed] : well developed [Well-Appearing] : well-appearing [Normal Sclera/Conjunctiva] : normal sclera/conjunctiva [PERRL] : pupils equal round and reactive to light [EOMI] : extraocular movements intact [Normal Outer Ear/Nose] : the outer ears and nose were normal in appearance [Normal Oropharynx] : the oropharynx was normal [No JVD] : no jugular venous distention [No Lymphadenopathy] : no lymphadenopathy [Supple] : supple [Thyroid Normal, No Nodules] : the thyroid was normal and there were no nodules present [No Respiratory Distress] : no respiratory distress  [No Accessory Muscle Use] : no accessory muscle use [Clear to Auscultation] : lungs were clear to auscultation bilaterally [Normal Rate] : normal rate  [Regular Rhythm] : with a regular rhythm [Normal S1, S2] : normal S1 and S2 [No Murmur] : no murmur heard [No Carotid Bruits] : no carotid bruits [No Abdominal Bruit] : a ~M bruit was not heard ~T in the abdomen [No Varicosities] : no varicosities [Pedal Pulses Present] : the pedal pulses are present [No Edema] : there was no peripheral edema [No Palpable Aorta] : no palpable aorta [No Extremity Clubbing/Cyanosis] : no extremity clubbing/cyanosis [Soft] : abdomen soft [Non Tender] : non-tender [Non-distended] : non-distended [No Masses] : no abdominal mass palpated [No HSM] : no HSM [Normal Bowel Sounds] : normal bowel sounds [Normal Posterior Cervical Nodes] : no posterior cervical lymphadenopathy [Normal Anterior Cervical Nodes] : no anterior cervical lymphadenopathy [No CVA Tenderness] : no CVA  tenderness [No Spinal Tenderness] : no spinal tenderness [No Joint Swelling] : no joint swelling [Grossly Normal Strength/Tone] : grossly normal strength/tone [No Rash] : no rash [Coordination Grossly Intact] : coordination grossly intact [No Focal Deficits] : no focal deficits [Normal Gait] : normal gait [Deep Tendon Reflexes (DTR)] : deep tendon reflexes were 2+ and symmetric [Normal Affect] : the affect was normal [Normal Insight/Judgement] : insight and judgment were intact [de-identified] : Oral candidiasis

## 2024-04-22 NOTE — HEALTH RISK ASSESSMENT
[Good] : ~his/her~  mood as  good [No] : No [No falls in past year] : Patient reported no falls in the past year [PHQ-2 Negative - No further assessment needed] : PHQ-2 Negative - No further assessment needed [PHQ-9 Negative - No further assessment needed] : PHQ-9 Negative - No further assessment needed [Never] : Never [[id number]] : 0

## 2024-04-22 NOTE — PLAN
[FreeTextEntry1] : #  iNCREASE  Mounjaro to 10 mg # GAINED FEW POUNDS # DISCUSSED DIETA ND CALORIE COUNT # DRINK WATER  # LOW FERRITIN- HEMATOLOGY APPT #AS PER APTIENT SLEEP APNEA TEST IS NEGATIVE  have lost weight 3 pounds in last in 6 week may need increase to 10 mg anemia- advice to increase iron intake, addchewable b12 twiceperweek  Hypertension better controlled Seems to be oral candidiasis and advised patient to use nystatin as directed sent doses for 3 months hypoglycemia discussed    # Start Rx Lisinopril 5 MG- advice to start againa s she is not taking it  # Start alcohol swabs # Start Blood Glucose Monitor System w/ Device kit # Start Blood glucose tst in vitro strips # Start Lancets ultra thin 30G # order mammogram # advised to measure weight at home every morning # advised to increase water intake # advised low salt diet  # f/u in one month or sooner if needed

## 2024-04-23 LAB
HBA1C MFR BLD HPLC: 5.8
LDLC SERPL CALC-MCNC: 107

## 2024-05-13 ENCOUNTER — OUTPATIENT (OUTPATIENT)
Dept: OUTPATIENT SERVICES | Facility: HOSPITAL | Age: 44
LOS: 1 days | Discharge: HOME | End: 2024-05-13

## 2024-05-13 DIAGNOSIS — D64.9 ANEMIA, UNSPECIFIED: ICD-10-CM

## 2024-05-16 ENCOUNTER — RESULT REVIEW (OUTPATIENT)
Age: 44
End: 2024-05-16

## 2024-05-16 ENCOUNTER — APPOINTMENT (OUTPATIENT)
Dept: HEMATOLOGY ONCOLOGY | Facility: CLINIC | Age: 44
End: 2024-05-16
Payer: MEDICAID

## 2024-05-16 VITALS
WEIGHT: 247.38 LBS | HEART RATE: 76 BPM | SYSTOLIC BLOOD PRESSURE: 134 MMHG | HEIGHT: 65 IN | DIASTOLIC BLOOD PRESSURE: 83 MMHG | TEMPERATURE: 98.3 F | RESPIRATION RATE: 16 BRPM | OXYGEN SATURATION: 98 % | BODY MASS INDEX: 41.22 KG/M2

## 2024-05-16 DIAGNOSIS — D64.9 ANEMIA, UNSPECIFIED: ICD-10-CM

## 2024-05-16 DIAGNOSIS — D50.9 IRON DEFICIENCY ANEMIA, UNSPECIFIED: ICD-10-CM

## 2024-05-16 LAB
BASOPHILS # BLD AUTO: 0.1 K/UL — SIGNIFICANT CHANGE UP (ref 0–0.2)
BASOPHILS NFR BLD AUTO: 1.6 % — SIGNIFICANT CHANGE UP (ref 0–2)
EOSINOPHIL # BLD AUTO: 0.2 K/UL — SIGNIFICANT CHANGE UP (ref 0–0.5)
EOSINOPHIL NFR BLD AUTO: 3.4 % — SIGNIFICANT CHANGE UP (ref 0–6)
HCT VFR BLD CALC: 41.3 % — SIGNIFICANT CHANGE UP (ref 34.5–45)
HGB BLD-MCNC: 13.1 G/DL — SIGNIFICANT CHANGE UP (ref 11.5–15.5)
LYMPHOCYTES # BLD AUTO: 1.6 K/UL — SIGNIFICANT CHANGE UP (ref 1–3.3)
LYMPHOCYTES # BLD AUTO: 23.5 % — SIGNIFICANT CHANGE UP (ref 13–44)
MCHC RBC-ENTMCNC: 24.4 PG — LOW (ref 27–34)
MCHC RBC-ENTMCNC: 31.6 G/DL — LOW (ref 32–36)
MCV RBC AUTO: 76.9 FL — LOW (ref 80–100)
MONOCYTES # BLD AUTO: 0.3 K/UL — SIGNIFICANT CHANGE UP (ref 0–0.9)
MONOCYTES NFR BLD AUTO: 5.2 % — SIGNIFICANT CHANGE UP (ref 2–14)
NEUTROPHILS # BLD AUTO: 4.4 K/UL — SIGNIFICANT CHANGE UP (ref 1.8–7.4)
NEUTROPHILS NFR BLD AUTO: 66.4 % — SIGNIFICANT CHANGE UP (ref 43–77)
PLATELET # BLD AUTO: 455 K/UL — HIGH (ref 150–400)
RBC # BLD: 5.36 M/UL — HIGH (ref 3.8–5.2)
RBC # FLD: 16.1 % — HIGH (ref 10.3–14.5)
WBC # BLD: 6.6 K/UL — SIGNIFICANT CHANGE UP (ref 3.8–10.5)
WBC # FLD AUTO: 6.6 K/UL — SIGNIFICANT CHANGE UP (ref 3.8–10.5)

## 2024-05-16 PROCEDURE — 99205 OFFICE O/P NEW HI 60 MIN: CPT

## 2024-05-16 NOTE — ASSESSMENT
[FreeTextEntry1] : Patient is a 43 F referred for iron deficiency anemia.  #Iron Deficiency Anemia #Microcytosis #Thrombocytosis - Previous CBC with PCP 4/16/24 Hgb 11.8 with MCV 76.3 with PLT 433K - Unclear if related to acute blood loss (GYN vs GI) or low iron intake - Will send repeat CBC, iron studies, B12, folate. Will assess if patient can remain on oral iron vs IV - Patient to increase to ferrous sulfate 325 mcg BID with Vitamin C  - Patient to do FOBT - Patient to follow-up with GYN  Follow-up in 2 months

## 2024-05-16 NOTE — HISTORY OF PRESENT ILLNESS
[de-identified] : Patient is a 43 F referred for iron deficiency anemia.  Patient states she has been iron deficient for at least 12 years. Has been on iron supplements for several years on and off. Since following with current PCP, has been on oral ferrous sulfate 325 mcg once daily with Vitamin C and Folate with little improvement in her iron deficiency anemia. Never received blood transfusion or IV iron. Endorses fatigue but no lightheadedness or syncope. No tarry stools, hematuria or BRBPR. States her menses are sometimes heavy, using up to 5 pads/day for 7 days with passage of heavy clots. Seen by GYN in the past, one fibroid but considered small. States diet has sufficient iron intake with green leafy vegetables 3x/week. No weight loss, abdominal pain, issues with digestion. No personal or family hx colon cancer.

## 2024-05-17 LAB
FERRITIN SERPL-MCNC: 17 NG/ML
FOLATE SERPL-MCNC: 13.9 NG/ML
IRON SATN MFR SERPL: 9 %
IRON SERPL-MCNC: 33 UG/DL
RBC # BLD: 5.2 M/UL
RETICS # AUTO: 1.4 %
RETICS AGGREG/RBC NFR: 70.2 K/UL
TIBC SERPL-MCNC: 368 UG/DL
UIBC SERPL-MCNC: 335 UG/DL
VIT B12 SERPL-MCNC: 911 PG/ML

## 2024-06-04 ENCOUNTER — OFFICE (OUTPATIENT)
Dept: URBAN - METROPOLITAN AREA CLINIC 115 | Facility: CLINIC | Age: 44
Setting detail: OPHTHALMOLOGY
End: 2024-06-04
Payer: COMMERCIAL

## 2024-06-04 DIAGNOSIS — H16.221: ICD-10-CM

## 2024-06-04 DIAGNOSIS — H40.013: ICD-10-CM

## 2024-06-04 DIAGNOSIS — H35.362: ICD-10-CM

## 2024-06-04 DIAGNOSIS — H16.222: ICD-10-CM

## 2024-06-04 PROCEDURE — 92133 CPTRZD OPH DX IMG PST SGM ON: CPT | Performed by: OPHTHALMOLOGY

## 2024-06-04 PROCEDURE — 83861 MICROFLUID ANALY TEARS: CPT | Mod: RT | Performed by: OPHTHALMOLOGY

## 2024-06-04 PROCEDURE — 92014 COMPRE OPH EXAM EST PT 1/>: CPT | Performed by: OPHTHALMOLOGY

## 2024-06-04 PROCEDURE — 83861 MICROFLUID ANALY TEARS: CPT | Mod: LT | Performed by: OPHTHALMOLOGY

## 2024-06-04 ASSESSMENT — CONFRONTATIONAL VISUAL FIELD TEST (CVF)
OD_FINDINGS: FULL
OS_FINDINGS: FULL

## 2024-06-18 ENCOUNTER — OFFICE (OUTPATIENT)
Dept: URBAN - METROPOLITAN AREA CLINIC 115 | Facility: CLINIC | Age: 44
Setting detail: OPHTHALMOLOGY
End: 2024-06-18
Payer: COMMERCIAL

## 2024-06-18 DIAGNOSIS — H11.153: ICD-10-CM

## 2024-06-18 DIAGNOSIS — H40.013: ICD-10-CM

## 2024-06-18 DIAGNOSIS — H16.221: ICD-10-CM

## 2024-06-18 DIAGNOSIS — H35.362: ICD-10-CM

## 2024-06-18 DIAGNOSIS — H52.13: ICD-10-CM

## 2024-06-18 DIAGNOSIS — H16.222: ICD-10-CM

## 2024-06-18 PROCEDURE — 99024 POSTOP FOLLOW-UP VISIT: CPT | Performed by: OPHTHALMOLOGY

## 2024-07-09 ENCOUNTER — OUTPATIENT (OUTPATIENT)
Dept: OUTPATIENT SERVICES | Facility: HOSPITAL | Age: 44
LOS: 1 days | Discharge: HOME | End: 2024-07-09

## 2024-07-09 DIAGNOSIS — D64.9 ANEMIA, UNSPECIFIED: ICD-10-CM

## 2024-07-15 ENCOUNTER — RESULT REVIEW (OUTPATIENT)
Age: 44
End: 2024-07-15

## 2024-07-15 ENCOUNTER — APPOINTMENT (OUTPATIENT)
Dept: HEMATOLOGY ONCOLOGY | Facility: CLINIC | Age: 44
End: 2024-07-15
Payer: MEDICAID

## 2024-07-15 VITALS
BODY MASS INDEX: 39.74 KG/M2 | HEART RATE: 79 BPM | OXYGEN SATURATION: 98 % | WEIGHT: 238.54 LBS | DIASTOLIC BLOOD PRESSURE: 79 MMHG | SYSTOLIC BLOOD PRESSURE: 140 MMHG | HEIGHT: 65 IN

## 2024-07-15 DIAGNOSIS — D50.9 IRON DEFICIENCY ANEMIA, UNSPECIFIED: ICD-10-CM

## 2024-07-15 LAB
BASOPHILS # BLD AUTO: 0.1 K/UL — SIGNIFICANT CHANGE UP (ref 0–0.2)
BASOPHILS NFR BLD AUTO: 1.1 % — SIGNIFICANT CHANGE UP (ref 0–2)
EOSINOPHIL # BLD AUTO: 0.3 K/UL — SIGNIFICANT CHANGE UP (ref 0–0.5)
EOSINOPHIL NFR BLD AUTO: 4.4 % — SIGNIFICANT CHANGE UP (ref 0–6)
HCT VFR BLD CALC: 46.3 % — HIGH (ref 34.5–45)
HGB BLD-MCNC: 14.2 G/DL — SIGNIFICANT CHANGE UP (ref 11.5–15.5)
LYMPHOCYTES # BLD AUTO: 1.4 K/UL — SIGNIFICANT CHANGE UP (ref 1–3.3)
LYMPHOCYTES # BLD AUTO: 23 % — SIGNIFICANT CHANGE UP (ref 13–44)
MCHC RBC-ENTMCNC: 25.4 PG — LOW (ref 27–34)
MCHC RBC-ENTMCNC: 30.8 G/DL — LOW (ref 32–36)
MCV RBC AUTO: 82.6 FL — SIGNIFICANT CHANGE UP (ref 80–100)
MONOCYTES # BLD AUTO: 0.4 K/UL — SIGNIFICANT CHANGE UP (ref 0–0.9)
MONOCYTES NFR BLD AUTO: 6 % — SIGNIFICANT CHANGE UP (ref 2–14)
NEUTROPHILS # BLD AUTO: 4.1 K/UL — SIGNIFICANT CHANGE UP (ref 1.8–7.4)
NEUTROPHILS NFR BLD AUTO: 65.6 % — SIGNIFICANT CHANGE UP (ref 43–77)
PLATELET # BLD AUTO: 354 K/UL — SIGNIFICANT CHANGE UP (ref 150–400)
RBC # BLD: 5.6 M/UL — HIGH (ref 3.8–5.2)
RBC # FLD: 15.8 % — HIGH (ref 10.3–14.5)
WBC # BLD: 6.2 K/UL — SIGNIFICANT CHANGE UP (ref 3.8–10.5)
WBC # FLD AUTO: 6.2 K/UL — SIGNIFICANT CHANGE UP (ref 3.8–10.5)

## 2024-07-15 PROCEDURE — 99214 OFFICE O/P EST MOD 30 MIN: CPT

## 2024-07-16 ENCOUNTER — NON-APPOINTMENT (OUTPATIENT)
Age: 44
End: 2024-07-16

## 2024-07-16 LAB
ALBUMIN SERPL ELPH-MCNC: 4.2 G/DL
ALP BLD-CCNC: 56 U/L
ALT SERPL-CCNC: 9 U/L
ANION GAP SERPL CALC-SCNC: 11 MMOL/L
BILIRUB SERPL-MCNC: <0.2 MG/DL
BUN SERPL-MCNC: 10 MG/DL
CHLORIDE SERPL-SCNC: 109 MMOL/L
CO2 SERPL-SCNC: 20 MMOL/L
CREAT SERPL-MCNC: 0.91 MG/DL
EGFR: 81 ML/MIN/1.73M2
FERRITIN SERPL-MCNC: 16 NG/ML
FOLATE SERPL-MCNC: 17.6 NG/ML
GLUCOSE SERPL-MCNC: 109 MG/DL
IRON SATN MFR SERPL: 75 %
IRON SERPL-MCNC: 285 UG/DL
PROT SERPL-MCNC: 7.2 G/DL
SODIUM SERPL-SCNC: 140 MMOL/L
TIBC SERPL-MCNC: 382 UG/DL
UIBC SERPL-MCNC: 97 UG/DL
VIT B12 SERPL-MCNC: 556 PG/ML

## 2024-10-22 ENCOUNTER — NON-APPOINTMENT (OUTPATIENT)
Age: 44
End: 2024-10-22

## 2025-04-28 ENCOUNTER — APPOINTMENT (OUTPATIENT)
Dept: MAMMOGRAPHY | Facility: CLINIC | Age: 45
End: 2025-04-28
Payer: MEDICAID

## 2025-04-28 ENCOUNTER — OUTPATIENT (OUTPATIENT)
Dept: OUTPATIENT SERVICES | Facility: HOSPITAL | Age: 45
LOS: 1 days | End: 2025-04-28
Payer: MEDICAID

## 2025-04-28 DIAGNOSIS — Z12.39 ENCOUNTER FOR OTHER SCREENING FOR MALIGNANT NEOPLASM OF BREAST: ICD-10-CM

## 2025-04-28 PROCEDURE — 77063 BREAST TOMOSYNTHESIS BI: CPT

## 2025-04-28 PROCEDURE — 77067 SCR MAMMO BI INCL CAD: CPT | Mod: 26

## 2025-04-28 PROCEDURE — 77067 SCR MAMMO BI INCL CAD: CPT

## 2025-04-28 PROCEDURE — 77063 BREAST TOMOSYNTHESIS BI: CPT | Mod: 26

## 2025-05-12 ENCOUNTER — NON-APPOINTMENT (OUTPATIENT)
Age: 45
End: 2025-05-12

## 2025-05-12 ENCOUNTER — APPOINTMENT (OUTPATIENT)
Dept: DERMATOLOGY | Facility: CLINIC | Age: 45
End: 2025-05-12
Payer: MEDICAID

## 2025-05-12 PROCEDURE — 99214 OFFICE O/P EST MOD 30 MIN: CPT

## 2025-08-22 ENCOUNTER — NON-APPOINTMENT (OUTPATIENT)
Age: 45
End: 2025-08-22

## 2025-09-08 ENCOUNTER — APPOINTMENT (OUTPATIENT)
Dept: DERMATOLOGY | Facility: CLINIC | Age: 45
End: 2025-09-08